# Patient Record
Sex: FEMALE | Race: WHITE | Employment: OTHER | URBAN - METROPOLITAN AREA
[De-identification: names, ages, dates, MRNs, and addresses within clinical notes are randomized per-mention and may not be internally consistent; named-entity substitution may affect disease eponyms.]

---

## 2017-01-11 ENCOUNTER — TELEPHONE (OUTPATIENT)
Dept: GASTROENTEROLOGY | Facility: CLINIC | Age: 62
End: 2017-01-11

## 2017-01-27 ENCOUNTER — TELEPHONE (OUTPATIENT)
Dept: GASTROENTEROLOGY | Facility: CLINIC | Age: 62
End: 2017-01-27

## 2017-01-27 NOTE — TELEPHONE ENCOUNTER
Dr. Tenisha Garrett- please see below communication along with 1/11/17 encounter and advise for prep/sedation.

## 2017-01-27 NOTE — TELEPHONE ENCOUNTER
Dr. Sharmin Sullivan - 6 month colon recall. Last Procedure:  Colon on 8.11.16  Last Diagnosis:  Large polyps X 6 ( 6 month follow up to make sure no residual polyp)  Recalled for (years): 6 months  Sedation used previously:  MAC  Last Prep Used (if known):   Fide Currie

## 2017-01-27 NOTE — TELEPHONE ENCOUNTER
Chart reviewed,   Colonoscopy to follow up on large polyp ascending colon removed piecemeal  - Miralax prep  - MAC sedation

## 2017-02-01 ENCOUNTER — TELEPHONE (OUTPATIENT)
Dept: GASTROENTEROLOGY | Facility: CLINIC | Age: 62
End: 2017-02-01

## 2017-02-01 NOTE — TELEPHONE ENCOUNTER
Scheduled for:  Colonoscopy 86260  Date:  3/21/17  Location:  [facility]  Sedation:  MAC  Time:  1115  Prep:  Miralax/Gatorade, mailed 2/1/17  Meds/Allergies Reconciled?:  Yes  Diagnosis with codes:  History of colon polyps Z86.010  Was patient informed to call i

## 2017-02-01 NOTE — TELEPHONE ENCOUNTER
Pt. States that she was talking to Surgery Scheduler, and the call was dropped. I tried to reach but no answer.

## 2017-02-03 ENCOUNTER — OFFICE VISIT (OUTPATIENT)
Dept: INTERNAL MEDICINE CLINIC | Facility: CLINIC | Age: 62
End: 2017-02-03

## 2017-02-03 ENCOUNTER — LAB ENCOUNTER (OUTPATIENT)
Dept: LAB | Age: 62
End: 2017-02-03
Attending: INTERNAL MEDICINE
Payer: COMMERCIAL

## 2017-02-03 VITALS
WEIGHT: 231.38 LBS | SYSTOLIC BLOOD PRESSURE: 118 MMHG | HEART RATE: 82 BPM | HEIGHT: 63 IN | TEMPERATURE: 98 F | BODY MASS INDEX: 41 KG/M2 | DIASTOLIC BLOOD PRESSURE: 70 MMHG | OXYGEN SATURATION: 99 %

## 2017-02-03 DIAGNOSIS — I71.4 ABDOMINAL AORTIC ANEURYSM (AAA) WITHOUT RUPTURE (HCC): ICD-10-CM

## 2017-02-03 DIAGNOSIS — Z00.00 PHYSICAL EXAM, ANNUAL: Primary | ICD-10-CM

## 2017-02-03 DIAGNOSIS — E78.00 PURE HYPERCHOLESTEROLEMIA: ICD-10-CM

## 2017-02-03 DIAGNOSIS — R35.89 POLYURIA: ICD-10-CM

## 2017-02-03 DIAGNOSIS — Z12.31 VISIT FOR SCREENING MAMMOGRAM: ICD-10-CM

## 2017-02-03 DIAGNOSIS — K57.90 DIVERTICULOSIS OF INTESTINE WITHOUT BLEEDING, UNSPECIFIED INTESTINAL TRACT LOCATION: ICD-10-CM

## 2017-02-03 DIAGNOSIS — R27.0 ATAXIA: ICD-10-CM

## 2017-02-03 DIAGNOSIS — D12.6 ADENOMATOUS POLYP OF COLON, UNSPECIFIED PART OF COLON: ICD-10-CM

## 2017-02-03 DIAGNOSIS — F17.200 SMOKING: ICD-10-CM

## 2017-02-03 DIAGNOSIS — Z00.00 PHYSICAL EXAM, ANNUAL: ICD-10-CM

## 2017-02-03 LAB
ALBUMIN SERPL BCP-MCNC: 4.4 G/DL (ref 3.5–4.8)
ALBUMIN/GLOB SERPL: 1.5 {RATIO} (ref 1–2)
ALP SERPL-CCNC: 63 U/L (ref 32–100)
ALT SERPL-CCNC: 34 U/L (ref 14–54)
ANION GAP SERPL CALC-SCNC: 8 MMOL/L (ref 0–18)
AST SERPL-CCNC: 31 U/L (ref 15–41)
BASOPHILS # BLD: 0.1 K/UL (ref 0–0.2)
BASOPHILS NFR BLD: 1 %
BILIRUB SERPL-MCNC: 0.7 MG/DL (ref 0.3–1.2)
BILIRUB UR QL: NEGATIVE
BUN SERPL-MCNC: 9 MG/DL (ref 8–20)
BUN/CREAT SERPL: 14.3 (ref 10–20)
CALCIUM SERPL-MCNC: 9.5 MG/DL (ref 8.5–10.5)
CHLORIDE SERPL-SCNC: 103 MMOL/L (ref 95–110)
CHOLEST SERPL-MCNC: 202 MG/DL (ref 110–200)
CO2 SERPL-SCNC: 29 MMOL/L (ref 22–32)
COLOR UR: YELLOW
CREAT SERPL-MCNC: 0.63 MG/DL (ref 0.5–1.5)
EOSINOPHIL # BLD: 0.1 K/UL (ref 0–0.7)
EOSINOPHIL NFR BLD: 1 %
ERYTHROCYTE [DISTWIDTH] IN BLOOD BY AUTOMATED COUNT: 13.8 % (ref 11–15)
GLOBULIN PLAS-MCNC: 2.9 G/DL (ref 2.5–3.7)
GLUCOSE SERPL-MCNC: 100 MG/DL (ref 70–99)
GLUCOSE UR-MCNC: NEGATIVE MG/DL
HCT VFR BLD AUTO: 43.5 % (ref 35–48)
HDLC SERPL-MCNC: 81 MG/DL
HGB BLD-MCNC: 14.5 G/DL (ref 12–16)
HGB UR QL STRIP.AUTO: NEGATIVE
KETONES UR-MCNC: NEGATIVE MG/DL
LDLC SERPL CALC-MCNC: 111 MG/DL (ref 0–99)
LEUKOCYTE ESTERASE UR QL STRIP.AUTO: NEGATIVE
LYMPHOCYTES # BLD: 3.3 K/UL (ref 1–4)
LYMPHOCYTES NFR BLD: 36 %
MCH RBC QN AUTO: 31.7 PG (ref 27–32)
MCHC RBC AUTO-ENTMCNC: 33.3 G/DL (ref 32–37)
MCV RBC AUTO: 95.3 FL (ref 80–100)
MONOCYTES # BLD: 0.7 K/UL (ref 0–1)
MONOCYTES NFR BLD: 8 %
NEUTROPHILS # BLD AUTO: 4.9 K/UL (ref 1.8–7.7)
NEUTROPHILS NFR BLD: 53 %
NITRITE UR QL STRIP.AUTO: NEGATIVE
NONHDLC SERPL-MCNC: 121 MG/DL
OSMOLALITY UR CALC.SUM OF ELEC: 289 MOSM/KG (ref 275–295)
PH UR: 5 [PH] (ref 5–8)
PLATELET # BLD AUTO: 211 K/UL (ref 140–400)
PMV BLD AUTO: 9.3 FL (ref 7.4–10.3)
POTASSIUM SERPL-SCNC: 4.2 MMOL/L (ref 3.3–5.1)
PROT SERPL-MCNC: 7.3 G/DL (ref 5.9–8.4)
PROT UR-MCNC: NEGATIVE MG/DL
RBC # BLD AUTO: 4.57 M/UL (ref 3.7–5.4)
RBC #/AREA URNS AUTO: 0 /HPF
SODIUM SERPL-SCNC: 140 MMOL/L (ref 136–144)
SP GR UR STRIP: 1.02 (ref 1–1.03)
TRIGL SERPL-MCNC: 50 MG/DL (ref 1–149)
TSH SERPL-ACNC: 2.19 UIU/ML (ref 0.34–5.6)
UROBILINOGEN UR STRIP-ACNC: <2
VIT B12 SERPL-MCNC: 588 PG/ML (ref 181–914)
VIT C UR-MCNC: 40 MG/DL
WBC # BLD AUTO: 9.2 K/UL (ref 4–11)
WBC #/AREA URNS AUTO: 1 /HPF

## 2017-02-03 PROCEDURE — 82607 VITAMIN B-12: CPT

## 2017-02-03 PROCEDURE — 36415 COLL VENOUS BLD VENIPUNCTURE: CPT

## 2017-02-03 PROCEDURE — 82306 VITAMIN D 25 HYDROXY: CPT

## 2017-02-03 PROCEDURE — 84443 ASSAY THYROID STIM HORMONE: CPT

## 2017-02-03 PROCEDURE — 81001 URINALYSIS AUTO W/SCOPE: CPT

## 2017-02-03 PROCEDURE — 80053 COMPREHEN METABOLIC PANEL: CPT

## 2017-02-03 PROCEDURE — 99396 PREV VISIT EST AGE 40-64: CPT | Performed by: INTERNAL MEDICINE

## 2017-02-03 PROCEDURE — 85025 COMPLETE CBC W/AUTO DIFF WBC: CPT

## 2017-02-03 PROCEDURE — 80061 LIPID PANEL: CPT

## 2017-02-03 NOTE — PROGRESS NOTES
Keerthi Kimball is a 59year old female.     HPI:   Patient presents with:  Physical: colonoscopy lupillo for 3/23, needs referral for mammo pended,  balance has been off.       58 y/o F here for physical exam; had flare of sciatica in Oct 2016; pain respon Negative for chest pain; negative palpitations  Respiratory:  Negative for cough, dyspnea and wheezing  Endocrine:  Negative for abnormal sleep patterns, increased activity, polydipsia and polyphagia  Gastrointestinal:  Negative for abdominal pain, consti abdominal aorta measures 3.2 x 3.1 cm; pt defers repeat testing for now    Smoking  Advised cessation; now 1/2 - 3/4 ppd; check UA    Poor dentition  Now has full dentures on top and bottom with dental implants    Screening mammogram  Mammogram in June 201

## 2017-02-06 LAB — 25(OH)D3 SERPL-MCNC: 24 NG/ML

## 2017-02-21 NOTE — TELEPHONE ENCOUNTER
Scheduled for:  Colonoscopy 18133  Date:    From-3/21/17  To-3/9/17  Location:  [facility]  Sedation:  MAC  Time:   From-1115  To-0800  Prep:  Miralax/Gatorade  Meds/Allergies Reconciled?:  Yes  Diagnosis with codes:  History of colon polyps Z86.010  Was patient

## 2017-03-02 ENCOUNTER — TELEPHONE (OUTPATIENT)
Dept: INTERNAL MEDICINE CLINIC | Facility: CLINIC | Age: 62
End: 2017-03-02

## 2017-03-09 ENCOUNTER — SURGERY (OUTPATIENT)
Age: 62
End: 2017-03-09

## 2017-03-09 ENCOUNTER — HOSPITAL ENCOUNTER (OUTPATIENT)
Facility: HOSPITAL | Age: 62
Setting detail: HOSPITAL OUTPATIENT SURGERY
Discharge: HOME | End: 2017-03-09
Attending: INTERNAL MEDICINE | Admitting: INTERNAL MEDICINE
Payer: COMMERCIAL

## 2017-03-09 ENCOUNTER — ANESTHESIA (OUTPATIENT)
Dept: ENDOSCOPY | Facility: HOSPITAL | Age: 62
End: 2017-03-09
Payer: COMMERCIAL

## 2017-03-09 ENCOUNTER — ANESTHESIA EVENT (OUTPATIENT)
Dept: ENDOSCOPY | Facility: HOSPITAL | Age: 62
End: 2017-03-09
Payer: COMMERCIAL

## 2017-03-09 VITALS
WEIGHT: 225 LBS | BODY MASS INDEX: 39.87 KG/M2 | SYSTOLIC BLOOD PRESSURE: 115 MMHG | HEART RATE: 81 BPM | OXYGEN SATURATION: 100 % | RESPIRATION RATE: 21 BRPM | HEIGHT: 63 IN | DIASTOLIC BLOOD PRESSURE: 83 MMHG

## 2017-03-09 PROCEDURE — 0DBN8ZX EXCISION OF SIGMOID COLON, VIA NATURAL OR ARTIFICIAL OPENING ENDOSCOPIC, DIAGNOSTIC: ICD-10-PCS | Performed by: INTERNAL MEDICINE

## 2017-03-09 PROCEDURE — 45380 COLONOSCOPY AND BIOPSY: CPT | Performed by: INTERNAL MEDICINE

## 2017-03-09 PROCEDURE — 0DBL8ZX EXCISION OF TRANSVERSE COLON, VIA NATURAL OR ARTIFICIAL OPENING ENDOSCOPIC, DIAGNOSTIC: ICD-10-PCS | Performed by: INTERNAL MEDICINE

## 2017-03-09 PROCEDURE — 45385 COLONOSCOPY W/LESION REMOVAL: CPT | Performed by: INTERNAL MEDICINE

## 2017-03-09 RX ORDER — SODIUM CHLORIDE 0.9 % (FLUSH) 0.9 %
10 SYRINGE (ML) INJECTION AS NEEDED
Status: DISCONTINUED | OUTPATIENT
Start: 2017-03-09 | End: 2017-03-09

## 2017-03-09 RX ORDER — NALOXONE HYDROCHLORIDE 0.4 MG/ML
80 INJECTION, SOLUTION INTRAMUSCULAR; INTRAVENOUS; SUBCUTANEOUS AS NEEDED
Status: DISCONTINUED | OUTPATIENT
Start: 2017-03-09 | End: 2017-03-09

## 2017-03-09 RX ORDER — SODIUM CHLORIDE, SODIUM LACTATE, POTASSIUM CHLORIDE, CALCIUM CHLORIDE 600; 310; 30; 20 MG/100ML; MG/100ML; MG/100ML; MG/100ML
INJECTION, SOLUTION INTRAVENOUS CONTINUOUS
Status: DISCONTINUED | OUTPATIENT
Start: 2017-03-09 | End: 2017-03-09

## 2017-03-09 RX ORDER — ONDANSETRON 2 MG/ML
4 INJECTION INTRAMUSCULAR; INTRAVENOUS ONCE AS NEEDED
Status: DISCONTINUED | OUTPATIENT
Start: 2017-03-09 | End: 2017-03-09

## 2017-03-09 RX ORDER — SODIUM CHLORIDE, SODIUM LACTATE, POTASSIUM CHLORIDE, CALCIUM CHLORIDE 600; 310; 30; 20 MG/100ML; MG/100ML; MG/100ML; MG/100ML
INJECTION, SOLUTION INTRAVENOUS CONTINUOUS PRN
Status: DISCONTINUED | OUTPATIENT
Start: 2017-03-09 | End: 2017-03-09 | Stop reason: SURG

## 2017-03-09 RX ADMIN — SODIUM CHLORIDE, SODIUM LACTATE, POTASSIUM CHLORIDE, CALCIUM CHLORIDE: 600; 310; 30; 20 INJECTION, SOLUTION INTRAVENOUS at 08:45:00

## 2017-03-09 RX ADMIN — SODIUM CHLORIDE, SODIUM LACTATE, POTASSIUM CHLORIDE, CALCIUM CHLORIDE: 600; 310; 30; 20 INJECTION, SOLUTION INTRAVENOUS at 08:22:00

## 2017-03-09 NOTE — ANESTHESIA PREPROCEDURE EVALUATION
Anesthesia PreOp Note    HPI:     Gilberto Shepherd is a 59year old female who presents for preoperative consultation requested by: Jones Bosworth, MD    Date of Surgery: 3/9/2017    Procedure(s):  COLONOSCOPY  Indication: Hx of colonic polyps     Pre colon cancer   • Heart Disorder Mother    • Diabetes Mother    • Cancer Brother      primary unknown       Social History   Marital Status: Single  Spouse Name: N/A    Years of Education: N/A  Number of Children: N/A     Occupational History  None on file Anesthesia Plan:   ASA:  2  Plan:   MAC  Post-op Pain Management: IV analgesics and Oral pain medication  Informed Consent Plan and Risks Discussed With:  Patient  Discussed plan with:  Surgeon      I have informed Rohan Watson  of the nature of t

## 2017-03-09 NOTE — H&P
History & Physical Examination    Patient Name: Marko Anderson  MRN: [id number]  [facility]: [id number]  YOB: 1955    Diagnosis: history of colon polyps    Present Illness: see chart      Prescriptions prior to admission:  psyllium (METAMUCIL SM the risks and benefits and alternatives with the patient/family. They understand and agree to proceed with plan of care. [ x ] I have reviewed the History and Physical done within the last 30 days. Any changes noted above. Kia Duran  3/9/2017

## 2017-03-09 NOTE — PROGRESS NOTES
Quick Note:    RN to place on the colon call back for 3 years and mail letter to the pt. Thanks.   ______

## 2017-03-09 NOTE — OPERATIVE REPORT
[facility] HOSP - [facility] Endoscopy Report      Preoperative Diagnosis:  - history of colon polyps    Postoperative Diagnosis:  - colon polyps x 3  - diverticulosis  - internal hemorrhoids    Procedure:    Colonoscopy     Surgeon:  Ernesto Zendejas M.D.

## 2017-03-09 NOTE — ANESTHESIA POSTPROCEDURE EVALUATION
Patient: Elenita Ku    Procedure Summary     Date Anesthesia Start Anesthesia Stop Room / Location    03/09/17 0822  [facility] ENDOSCOPY 05 / [facility] ENDOSCOPY       Procedure Diagnosis Surgeon Responsible Provider    COLONOSCOPY (N/A ) Hx of colonic polyps

## 2017-03-13 ENCOUNTER — TELEPHONE (OUTPATIENT)
Dept: GASTROENTEROLOGY | Facility: CLINIC | Age: 62
End: 2017-03-13

## 2017-03-13 NOTE — TELEPHONE ENCOUNTER
----- Message from Anil Larson MD sent at 3/9/2017  2:04 PM CST -----  RN to place on the colon call back for 3 years and mail letter to the pt. Thanks.

## 2017-03-20 ENCOUNTER — TELEPHONE (OUTPATIENT)
Dept: GASTROENTEROLOGY | Facility: CLINIC | Age: 62
End: 2017-03-20

## 2017-03-20 NOTE — TELEPHONE ENCOUNTER
Pt requesting RN to mail out Arsenio Fitzgerald 63 Pathology Report. For add'l questions pls call. Thank you.

## 2017-04-01 ENCOUNTER — HOSPITAL ENCOUNTER (OUTPATIENT)
Dept: MAMMOGRAPHY | Facility: HOSPITAL | Age: 62
Discharge: HOME | End: 2017-04-01
Attending: INTERNAL MEDICINE
Payer: COMMERCIAL

## 2017-04-01 DIAGNOSIS — Z12.31 VISIT FOR SCREENING MAMMOGRAM: ICD-10-CM

## 2017-04-01 PROCEDURE — 77067 SCR MAMMO BI INCL CAD: CPT

## 2017-05-19 ENCOUNTER — HOSPITAL ENCOUNTER (OUTPATIENT)
Dept: GENERAL RADIOLOGY | Facility: HOSPITAL | Age: 62
Discharge: HOME | End: 2017-05-19
Attending: INTERNAL MEDICINE
Payer: COMMERCIAL

## 2017-05-19 ENCOUNTER — OFFICE VISIT (OUTPATIENT)
Dept: INTERNAL MEDICINE CLINIC | Facility: CLINIC | Age: 62
End: 2017-05-19

## 2017-05-19 VITALS
WEIGHT: 233 LBS | HEART RATE: 48 BPM | DIASTOLIC BLOOD PRESSURE: 78 MMHG | BODY MASS INDEX: 41.29 KG/M2 | SYSTOLIC BLOOD PRESSURE: 110 MMHG | RESPIRATION RATE: 18 BRPM | OXYGEN SATURATION: 97 % | TEMPERATURE: 98 F | HEIGHT: 63 IN

## 2017-05-19 DIAGNOSIS — D12.6 ADENOMATOUS POLYP OF COLON, UNSPECIFIED PART OF COLON: ICD-10-CM

## 2017-05-19 DIAGNOSIS — M47.816 SPONDYLOSIS OF LUMBAR REGION WITHOUT MYELOPATHY OR RADICULOPATHY: ICD-10-CM

## 2017-05-19 DIAGNOSIS — M25.551 RIGHT HIP PAIN: ICD-10-CM

## 2017-05-19 DIAGNOSIS — M25.551 RIGHT HIP PAIN: Primary | ICD-10-CM

## 2017-05-19 DIAGNOSIS — E78.00 PURE HYPERCHOLESTEROLEMIA: ICD-10-CM

## 2017-05-19 DIAGNOSIS — F17.200 SMOKING: ICD-10-CM

## 2017-05-19 PROCEDURE — 72100 X-RAY EXAM L-S SPINE 2/3 VWS: CPT | Performed by: INTERNAL MEDICINE

## 2017-05-19 PROCEDURE — 99212 OFFICE O/P EST SF 10 MIN: CPT | Performed by: INTERNAL MEDICINE

## 2017-05-19 PROCEDURE — 73503 X-RAY EXAM HIP UNI 4/> VIEWS: CPT | Performed by: INTERNAL MEDICINE

## 2017-05-19 PROCEDURE — 99214 OFFICE O/P EST MOD 30 MIN: CPT | Performed by: INTERNAL MEDICINE

## 2017-05-19 NOTE — PROGRESS NOTES
Keerthi Kimball is a 59year old female. HPI:   Patient presents with:  Leg Pain: Pt states right thigh pain/weakness for a few months. Pt started walking on treadmill recently. Pt states pain is worse at night.       58 y/o F with c/o right thigh an mouth daily. Disp:  Rfl:    Multiple Vitamin (ONE-DAILY MULTI VITAMINS) Oral Tab Take 1 tablet by mouth daily.  Disp:  Rfl:        Allergies:    Bactrim [Sulfametho*    Rash              ROS:   Constitutional: no weight loss; no fatigue  Cardiovascular:  Ne cm; pt defers repeat testing for now    Smoking  Advised cessation; now 1/2 - 3/4 ppd; check UA    Poor dentition  Now has full dentures on top and bottom with dental implants    Screening mammogram  Mammogram in April 2017 was normal    Hypercholesterolem

## 2017-10-04 ENCOUNTER — OFFICE VISIT (OUTPATIENT)
Dept: INTERNAL MEDICINE CLINIC | Facility: CLINIC | Age: 62
End: 2017-10-04

## 2017-10-04 VITALS
HEART RATE: 84 BPM | DIASTOLIC BLOOD PRESSURE: 72 MMHG | TEMPERATURE: 98 F | WEIGHT: 242 LBS | BODY MASS INDEX: 41.32 KG/M2 | HEIGHT: 64 IN | SYSTOLIC BLOOD PRESSURE: 142 MMHG

## 2017-10-04 DIAGNOSIS — F17.200 SMOKING: ICD-10-CM

## 2017-10-04 DIAGNOSIS — E78.00 PURE HYPERCHOLESTEROLEMIA: ICD-10-CM

## 2017-10-04 DIAGNOSIS — M16.11 PRIMARY OSTEOARTHRITIS OF RIGHT HIP: Primary | ICD-10-CM

## 2017-10-04 DIAGNOSIS — M47.816 SPONDYLOSIS OF LUMBAR REGION WITHOUT MYELOPATHY OR RADICULOPATHY: ICD-10-CM

## 2017-10-04 DIAGNOSIS — D12.6 ADENOMATOUS POLYP OF COLON, UNSPECIFIED PART OF COLON: ICD-10-CM

## 2017-10-04 DIAGNOSIS — I10 BENIGN HYPERTENSION: ICD-10-CM

## 2017-10-04 PROCEDURE — 90686 IIV4 VACC NO PRSV 0.5 ML IM: CPT | Performed by: INTERNAL MEDICINE

## 2017-10-04 PROCEDURE — 99214 OFFICE O/P EST MOD 30 MIN: CPT | Performed by: INTERNAL MEDICINE

## 2017-10-04 PROCEDURE — 90471 IMMUNIZATION ADMIN: CPT | Performed by: INTERNAL MEDICINE

## 2017-10-04 RX ORDER — MELOXICAM 15 MG/1
15 TABLET ORAL DAILY
Qty: 30 TABLET | Refills: 3 | Status: SHIPPED | OUTPATIENT
Start: 2017-10-04 | End: 2019-01-09

## 2017-10-04 NOTE — PROGRESS NOTES
Shakir Marquez is a 57year old female. HPI:   Patient presents with:  Hip Pain: Pt complains of right hip pain. She knows she needs a hip replacement.  She is hoping for an rx for Meloxicam.      58 y/o F with severe osteoarthritis of right hip her Start date: 6/16/1979  Alcohol use: Yes           1.8 oz/week     Standard drinks or equivalent: 3 per week       Medications (Active prior to today's visit):    Current Outpatient Prescriptions:  Meloxicam 15 MG Oral Tab Take 1 tablet (15 mg total) by enid Toño Costello; EKG today shows NSR without ischemic abnormality     Smoking   Advised cessation; now 1/2 - 3/4 ppd; UA in Feb 2017 neg hematuria; does not wish to take bupropion, or Chantix;      Osteoarthritis of lumbar spine  XR lumbar spine in 2013 shows mode

## 2017-12-20 ENCOUNTER — TELEPHONE (OUTPATIENT)
Dept: INTERNAL MEDICINE CLINIC | Facility: CLINIC | Age: 62
End: 2017-12-20

## 2017-12-20 DIAGNOSIS — D68.9 COAGULOPATHY (HCC): ICD-10-CM

## 2017-12-20 DIAGNOSIS — R53.83 OTHER FATIGUE: ICD-10-CM

## 2017-12-20 DIAGNOSIS — E78.00 HYPERCHOLESTEROLEMIA: Primary | ICD-10-CM

## 2017-12-20 DIAGNOSIS — M16.10 PRIMARY OSTEOARTHRITIS OF HIP, UNSPECIFIED LATERALITY: ICD-10-CM

## 2017-12-20 DIAGNOSIS — R35.89 POLYURIA: ICD-10-CM

## 2017-12-20 DIAGNOSIS — Z01.818 PREOP EXAMINATION: ICD-10-CM

## 2017-12-21 ENCOUNTER — LAB ENCOUNTER (OUTPATIENT)
Dept: LAB | Age: 62
End: 2017-12-21
Attending: INTERNAL MEDICINE
Payer: COMMERCIAL

## 2017-12-21 DIAGNOSIS — R53.83 OTHER FATIGUE: ICD-10-CM

## 2017-12-21 DIAGNOSIS — E78.00 HYPERCHOLESTEROLEMIA: ICD-10-CM

## 2017-12-21 DIAGNOSIS — D68.9 COAGULOPATHY (HCC): ICD-10-CM

## 2017-12-21 DIAGNOSIS — R35.89 POLYURIA: ICD-10-CM

## 2017-12-21 DIAGNOSIS — Z01.818 PREOP EXAMINATION: ICD-10-CM

## 2017-12-21 PROCEDURE — 87086 URINE CULTURE/COLONY COUNT: CPT

## 2017-12-21 PROCEDURE — 84443 ASSAY THYROID STIM HORMONE: CPT

## 2017-12-21 PROCEDURE — 85610 PROTHROMBIN TIME: CPT

## 2017-12-21 PROCEDURE — 85025 COMPLETE CBC W/AUTO DIFF WBC: CPT

## 2017-12-21 PROCEDURE — 80053 COMPREHEN METABOLIC PANEL: CPT

## 2017-12-21 PROCEDURE — 85730 THROMBOPLASTIN TIME PARTIAL: CPT

## 2017-12-21 PROCEDURE — 36415 COLL VENOUS BLD VENIPUNCTURE: CPT

## 2017-12-21 PROCEDURE — 81001 URINALYSIS AUTO W/SCOPE: CPT

## 2017-12-27 ENCOUNTER — OFFICE VISIT (OUTPATIENT)
Dept: INTERNAL MEDICINE CLINIC | Facility: CLINIC | Age: 62
End: 2017-12-27

## 2017-12-27 ENCOUNTER — TELEPHONE (OUTPATIENT)
Dept: INTERNAL MEDICINE CLINIC | Facility: CLINIC | Age: 62
End: 2017-12-27

## 2017-12-27 VITALS
HEIGHT: 64 IN | TEMPERATURE: 97 F | BODY MASS INDEX: 40.74 KG/M2 | HEART RATE: 95 BPM | OXYGEN SATURATION: 98 % | SYSTOLIC BLOOD PRESSURE: 130 MMHG | DIASTOLIC BLOOD PRESSURE: 74 MMHG | WEIGHT: 238.63 LBS

## 2017-12-27 DIAGNOSIS — M16.10 PRIMARY OSTEOARTHRITIS OF HIP, UNSPECIFIED LATERALITY: Primary | ICD-10-CM

## 2017-12-27 DIAGNOSIS — E78.00 PURE HYPERCHOLESTEROLEMIA: ICD-10-CM

## 2017-12-27 DIAGNOSIS — F17.200 SMOKING: ICD-10-CM

## 2017-12-27 DIAGNOSIS — D12.6 ADENOMATOUS POLYP OF COLON, UNSPECIFIED PART OF COLON: ICD-10-CM

## 2017-12-27 PROCEDURE — 99212 OFFICE O/P EST SF 10 MIN: CPT | Performed by: INTERNAL MEDICINE

## 2017-12-27 PROCEDURE — 99214 OFFICE O/P EST MOD 30 MIN: CPT | Performed by: INTERNAL MEDICINE

## 2017-12-27 RX ORDER — IBUPROFEN 200 MG
200 TABLET ORAL EVERY 6 HOURS PRN
COMMUNITY

## 2017-12-27 NOTE — PROGRESS NOTES
Mauro Nugent is a 57year old female. HPI:   Patient presents with:  Surgical/procedure Clearance: Patient scheduled for right hip replacement 1/15/18 with Dr. Tina Rogers at [place]. Had labs done last week.  States she needs an EKG, had one done 10/2017 37.00        Types: Cigarettes     Start date: 6/16/1977  Smokeless tobacco: Never Used                      Alcohol use: Yes           1.8 oz/week     Standard drinks or equivalent: 3 per week       Medications (Active prior to today's visit):    Current thyromegaly  Lymphatics: no lymphadenopathy of neck or groin  Cardiovascular: RRR, S1, S2, no S3 or murmur  Respiratory: lungs without crackles or wheezes  Abdomen: normoactive bowel sounds, soft, non-tender and non-distended  Extremities: no clubbing, cya defers repeat testing for now     Poor dentition  Now has full dentures on top and bottom with dental implants     Screening mammogram  Mammogram in April 2017 was normal     Hypercholesterolemia   in Feb 2017     Carpal tunnel syndrome  Wears bilat

## 2017-12-28 NOTE — TELEPHONE ENCOUNTER
Pt would like to have a copy of the surgical clearance mailed to her. Address verified.     Tasked to getupp

## 2018-01-08 NOTE — TELEPHONE ENCOUNTER
[facility] office - needs clearance, EKG and labs faxed to N:[phone]    T: [phone]  She said she never received it the first time.       Tasked to Nursing

## 2018-07-19 ENCOUNTER — APPOINTMENT (OUTPATIENT)
Dept: ULTRASOUND IMAGING | Facility: HOSPITAL | Age: 63
End: 2018-07-19
Attending: EMERGENCY MEDICINE
Payer: COMMERCIAL

## 2018-07-19 ENCOUNTER — HOSPITAL ENCOUNTER (EMERGENCY)
Facility: HOSPITAL | Age: 63
Discharge: HOME | End: 2018-07-20
Attending: EMERGENCY MEDICINE
Payer: COMMERCIAL

## 2018-07-19 DIAGNOSIS — S86.812A STRAIN OF CALF MUSCLE, LEFT, INITIAL ENCOUNTER: Primary | ICD-10-CM

## 2018-07-19 PROCEDURE — 93971 EXTREMITY STUDY: CPT | Performed by: EMERGENCY MEDICINE

## 2018-07-19 PROCEDURE — 99284 EMERGENCY DEPT VISIT MOD MDM: CPT

## 2018-07-20 VITALS
DIASTOLIC BLOOD PRESSURE: 80 MMHG | HEIGHT: 65 IN | WEIGHT: 235 LBS | BODY MASS INDEX: 39.15 KG/M2 | TEMPERATURE: 98 F | RESPIRATION RATE: 17 BRPM | SYSTOLIC BLOOD PRESSURE: 124 MMHG | OXYGEN SATURATION: 98 % | HEART RATE: 91 BPM

## 2018-07-20 NOTE — ED INITIAL ASSESSMENT (HPI)
Pt c/o cramping/ deep muscle pain to left calf, pt sts that it looks like swollen but she is not sure, pt sts that the reason she come to ER because she wanted to make sure it is not DVT, denies reascent travel, not on blood thinner

## 2018-07-20 NOTE — ED PROVIDER NOTES
Patient Seen in: [facility] AND [facility] Emergency Department    History   Patient presents with:  Deep Vein Thrombosis (cardiovascular)    Stated Complaint: swelling+cramp like pain to left leg    HPI    49-year-old female with past medical history significan to left leg  Other systems are as noted in HPI. Constitutional and vital signs reviewed. All other systems reviewed and negative except as noted above.     Physical Exam   ED Triage Vitals [07/19/18 2312]  BP: 118/75  Pulse: 114  Resp: 18  Temp: 98.1 (entered by Technologist):    Reason For Exam (entered by Technologist):  left leg ayleen horse sunday, still hurts  Other Notes (entered by Technologist): no dvt seen left leg    Additional Information (per Vision Radiologist):      Ultrasound venous lef

## 2019-01-09 ENCOUNTER — OFFICE VISIT (OUTPATIENT)
Dept: INTERNAL MEDICINE CLINIC | Facility: CLINIC | Age: 64
End: 2019-01-09
Payer: MEDICARE

## 2019-01-09 VITALS
TEMPERATURE: 99 F | SYSTOLIC BLOOD PRESSURE: 132 MMHG | BODY MASS INDEX: 40 KG/M2 | HEART RATE: 102 BPM | OXYGEN SATURATION: 96 % | WEIGHT: 240 LBS | DIASTOLIC BLOOD PRESSURE: 78 MMHG

## 2019-01-09 DIAGNOSIS — I71.4 ABDOMINAL AORTIC ANEURYSM (AAA) WITHOUT RUPTURE (HCC): ICD-10-CM

## 2019-01-09 DIAGNOSIS — E55.9 VITAMIN D DEFICIENCY: ICD-10-CM

## 2019-01-09 DIAGNOSIS — R53.83 OTHER FATIGUE: ICD-10-CM

## 2019-01-09 DIAGNOSIS — D12.6 ADENOMATOUS POLYP OF COLON, UNSPECIFIED PART OF COLON: ICD-10-CM

## 2019-01-09 DIAGNOSIS — Z12.31 VISIT FOR SCREENING MAMMOGRAM: ICD-10-CM

## 2019-01-09 DIAGNOSIS — F17.200 SMOKING: ICD-10-CM

## 2019-01-09 DIAGNOSIS — M25.511 ACUTE PAIN OF RIGHT SHOULDER: ICD-10-CM

## 2019-01-09 DIAGNOSIS — I10 BENIGN HYPERTENSION: ICD-10-CM

## 2019-01-09 DIAGNOSIS — E78.00 HYPERCHOLESTEREMIA: ICD-10-CM

## 2019-01-09 DIAGNOSIS — M16.10 PRIMARY OSTEOARTHRITIS OF HIP, UNSPECIFIED LATERALITY: Primary | ICD-10-CM

## 2019-01-09 DIAGNOSIS — K57.90 DIVERTICULOSIS OF INTESTINE WITHOUT BLEEDING, UNSPECIFIED INTESTINAL TRACT LOCATION: ICD-10-CM

## 2019-01-09 PROCEDURE — 99214 OFFICE O/P EST MOD 30 MIN: CPT | Performed by: INTERNAL MEDICINE

## 2019-01-09 PROCEDURE — G0463 HOSPITAL OUTPT CLINIC VISIT: HCPCS | Performed by: INTERNAL MEDICINE

## 2019-01-09 NOTE — PROGRESS NOTES
Antoni Hernandez is a 70year old female. HPI:   Patient presents with:   Follow - Up      69 y/o F with Right hip osteoarthritis here for F/U; s/p right hip THR under care of orthopedics Dr Zoe Vieira on 1/15/18 at [facility]; using cane to ambulate every 6 (six) hours as needed for Pain. Disp:  Rfl:    psyllium (METAMUCIL SMOOTH TEXTURE) 28 % Oral Powd Pack Take 1 packet by mouth daily. Disp:  Rfl:    Multiple Vitamin (ONE-DAILY MULTI VITAMINS) Oral Tab Take 1 tablet by mouth daily.  Disp:  Rfl: ulcerations         ASSESSMENT/PLAN:   Right hip osteoarthritis  s/p right hip THR under care of orthopedics Dr Aida Fatima on 1/15/18 at [facility]; using cane to ambulate;   No longer taking ibuprofen 200 mg po qD     Smoking  Denies dyspnea; advised cess Routine [E]      Meds This Visit:  Requested Prescriptions      No prescriptions requested or ordered in this encounter       Imaging & Referrals:  ARABELLA SCREENING BILAT (CPT=77067)  XR SHOULDER, COMPLETE (MIN 2 VIEWS), RIGHT (CPT=73030)     1/9/2019  [facility]

## 2019-01-17 ENCOUNTER — LAB ENCOUNTER (OUTPATIENT)
Dept: LAB | Age: 64
End: 2019-01-17
Attending: INTERNAL MEDICINE
Payer: MEDICARE

## 2019-01-17 DIAGNOSIS — R53.83 OTHER FATIGUE: ICD-10-CM

## 2019-01-17 DIAGNOSIS — E55.9 VITAMIN D DEFICIENCY: ICD-10-CM

## 2019-01-17 DIAGNOSIS — E78.00 HYPERCHOLESTEREMIA: ICD-10-CM

## 2019-01-17 DIAGNOSIS — I10 BENIGN HYPERTENSION: ICD-10-CM

## 2019-01-17 LAB
ALBUMIN SERPL BCP-MCNC: 4.1 G/DL (ref 3.5–4.8)
ALBUMIN/GLOB SERPL: 1.4 {RATIO} (ref 1–2)
ALP SERPL-CCNC: 64 U/L (ref 32–100)
ALT SERPL-CCNC: 38 U/L (ref 14–54)
ANION GAP SERPL CALC-SCNC: 11 MMOL/L (ref 0–18)
AST SERPL-CCNC: 31 U/L (ref 15–41)
BASOPHILS # BLD: 0.1 K/UL (ref 0–0.2)
BASOPHILS NFR BLD: 1 %
BILIRUB SERPL-MCNC: 0.6 MG/DL (ref 0.3–1.2)
BILIRUB UR QL: NEGATIVE
BUN SERPL-MCNC: 9 MG/DL (ref 8–20)
BUN/CREAT SERPL: 12.7 (ref 10–20)
CALCIUM SERPL-MCNC: 9.4 MG/DL (ref 8.5–10.5)
CHLORIDE SERPL-SCNC: 104 MMOL/L (ref 95–110)
CHOLEST SERPL-MCNC: 202 MG/DL (ref 110–200)
CLARITY UR: CLEAR
CO2 SERPL-SCNC: 24 MMOL/L (ref 22–32)
COLOR UR: YELLOW
CREAT SERPL-MCNC: 0.71 MG/DL (ref 0.5–1.5)
EOSINOPHIL # BLD: 0.2 K/UL (ref 0–0.7)
EOSINOPHIL NFR BLD: 2 %
ERYTHROCYTE [DISTWIDTH] IN BLOOD BY AUTOMATED COUNT: 13.1 % (ref 11–15)
GLOBULIN PLAS-MCNC: 3 G/DL (ref 2.5–3.7)
GLUCOSE SERPL-MCNC: 100 MG/DL (ref 70–99)
GLUCOSE UR-MCNC: NEGATIVE MG/DL
HCT VFR BLD AUTO: 42.3 % (ref 35–48)
HDLC SERPL-MCNC: 71 MG/DL
HGB BLD-MCNC: 14.5 G/DL (ref 12–16)
HGB UR QL STRIP.AUTO: NEGATIVE
KETONES UR-MCNC: NEGATIVE MG/DL
LDLC SERPL CALC-MCNC: 118 MG/DL (ref 0–99)
LEUKOCYTE ESTERASE UR QL STRIP.AUTO: NEGATIVE
LYMPHOCYTES # BLD: 2.5 K/UL (ref 1–4)
LYMPHOCYTES NFR BLD: 29 %
MCH RBC QN AUTO: 32.7 PG (ref 27–32)
MCHC RBC AUTO-ENTMCNC: 34.2 G/DL (ref 32–37)
MCV RBC AUTO: 95.6 FL (ref 80–100)
MONOCYTES # BLD: 0.7 K/UL (ref 0–1)
MONOCYTES NFR BLD: 8 %
NEUTROPHILS # BLD AUTO: 5.2 K/UL (ref 1.8–7.7)
NEUTROPHILS NFR BLD: 60 %
NITRITE UR QL STRIP.AUTO: NEGATIVE
NONHDLC SERPL-MCNC: 131 MG/DL
OSMOLALITY UR CALC.SUM OF ELEC: 287 MOSM/KG (ref 275–295)
PATIENT FASTING: YES
PH UR: 7 [PH] (ref 5–8)
PLATELET # BLD AUTO: 228 K/UL (ref 140–400)
PMV BLD AUTO: 8.7 FL (ref 7.4–10.3)
POTASSIUM SERPL-SCNC: 4.4 MMOL/L (ref 3.3–5.1)
PROT SERPL-MCNC: 7.1 G/DL (ref 5.9–8.4)
PROT UR-MCNC: NEGATIVE MG/DL
RBC # BLD AUTO: 4.43 M/UL (ref 3.7–5.4)
RBC #/AREA URNS AUTO: 0 /HPF
SODIUM SERPL-SCNC: 139 MMOL/L (ref 136–144)
SP GR UR STRIP: 1.01 (ref 1–1.03)
TRIGL SERPL-MCNC: 66 MG/DL (ref 1–149)
TSH SERPL-ACNC: 3.42 UIU/ML (ref 0.45–5.33)
UROBILINOGEN UR STRIP-ACNC: <2
VIT C UR-MCNC: 20 MG/DL
WBC # BLD AUTO: 8.7 K/UL (ref 4–11)
WBC #/AREA URNS AUTO: 1 /HPF

## 2019-01-17 PROCEDURE — 81003 URINALYSIS AUTO W/O SCOPE: CPT

## 2019-01-17 PROCEDURE — 80061 LIPID PANEL: CPT

## 2019-01-17 PROCEDURE — 36415 COLL VENOUS BLD VENIPUNCTURE: CPT

## 2019-01-17 PROCEDURE — 84443 ASSAY THYROID STIM HORMONE: CPT

## 2019-01-17 PROCEDURE — 85025 COMPLETE CBC W/AUTO DIFF WBC: CPT

## 2019-01-17 PROCEDURE — 82306 VITAMIN D 25 HYDROXY: CPT

## 2019-01-17 PROCEDURE — 80053 COMPREHEN METABOLIC PANEL: CPT

## 2019-01-18 ENCOUNTER — TELEPHONE (OUTPATIENT)
Dept: INTERNAL MEDICINE CLINIC | Facility: CLINIC | Age: 64
End: 2019-01-18

## 2019-01-18 LAB — 25(OH)D3 SERPL-MCNC: 25.4 NG/ML (ref 30–100)

## 2019-01-18 NOTE — TELEPHONE ENCOUNTER
Labs 1/18 reviewed; Vitamin D level low at 25 (normal >30); may take Vitamin D 1000 IU po qD (available OTC); all other labs OK; please call pt

## 2019-02-09 ENCOUNTER — HOSPITAL ENCOUNTER (OUTPATIENT)
Dept: MAMMOGRAPHY | Facility: HOSPITAL | Age: 64
Discharge: HOME | End: 2019-02-09
Attending: INTERNAL MEDICINE
Payer: MEDICARE

## 2019-02-09 DIAGNOSIS — Z12.31 VISIT FOR SCREENING MAMMOGRAM: ICD-10-CM

## 2019-02-09 PROCEDURE — 77063 BREAST TOMOSYNTHESIS BI: CPT | Performed by: INTERNAL MEDICINE

## 2019-02-09 PROCEDURE — 77067 SCR MAMMO BI INCL CAD: CPT | Performed by: INTERNAL MEDICINE

## 2019-12-18 ENCOUNTER — OFFICE VISIT (OUTPATIENT)
Dept: INTERNAL MEDICINE CLINIC | Facility: CLINIC | Age: 64
End: 2019-12-18
Payer: MEDICARE

## 2019-12-18 VITALS
BODY MASS INDEX: 39.15 KG/M2 | WEIGHT: 235 LBS | DIASTOLIC BLOOD PRESSURE: 70 MMHG | HEIGHT: 65 IN | SYSTOLIC BLOOD PRESSURE: 132 MMHG | OXYGEN SATURATION: 96 % | HEART RATE: 94 BPM

## 2019-12-18 DIAGNOSIS — E55.9 VITAMIN D DEFICIENCY: ICD-10-CM

## 2019-12-18 DIAGNOSIS — R35.89 POLYURIA: ICD-10-CM

## 2019-12-18 DIAGNOSIS — D12.6 ADENOMATOUS POLYP OF COLON, UNSPECIFIED PART OF COLON: ICD-10-CM

## 2019-12-18 DIAGNOSIS — R53.83 OTHER FATIGUE: ICD-10-CM

## 2019-12-18 DIAGNOSIS — E78.00 HYPERCHOLESTEREMIA: ICD-10-CM

## 2019-12-18 DIAGNOSIS — M16.10 PRIMARY OSTEOARTHRITIS OF HIP, UNSPECIFIED LATERALITY: ICD-10-CM

## 2019-12-18 DIAGNOSIS — Z12.31 VISIT FOR SCREENING MAMMOGRAM: ICD-10-CM

## 2019-12-18 DIAGNOSIS — G56.03 BILATERAL CARPAL TUNNEL SYNDROME: Primary | ICD-10-CM

## 2019-12-18 PROCEDURE — 90662 IIV NO PRSV INCREASED AG IM: CPT | Performed by: INTERNAL MEDICINE

## 2019-12-18 PROCEDURE — G0463 HOSPITAL OUTPT CLINIC VISIT: HCPCS | Performed by: INTERNAL MEDICINE

## 2019-12-18 PROCEDURE — 99214 OFFICE O/P EST MOD 30 MIN: CPT | Performed by: INTERNAL MEDICINE

## 2019-12-18 PROCEDURE — G0009 ADMIN PNEUMOCOCCAL VACCINE: HCPCS | Performed by: INTERNAL MEDICINE

## 2019-12-18 PROCEDURE — G0008 ADMIN INFLUENZA VIRUS VAC: HCPCS | Performed by: INTERNAL MEDICINE

## 2019-12-18 PROCEDURE — 90670 PCV13 VACCINE IM: CPT | Performed by: INTERNAL MEDICINE

## 2019-12-18 NOTE — PROGRESS NOTES
Javier Choudhury is a 75year old female.     HPI:   Patient presents with:  Carpal Tunnel Syndrome      74 y/o F with c/o nocturnal paresthesias since 2013; has been wearing nocturnal wrist splints intermittently since hat time; she presents with c/o wo (VITAMIN D) 1000 units Oral Tab Take 1000 Units daily 1 tablet 0   • ibuprofen 200 MG Oral Tab Take 200 mg by mouth every 6 (six) hours as needed for Pain. • psyllium (METAMUCIL SMOOTH TEXTURE) 28 % Oral Powd Pack Take 1 packet by mouth daily.      • Mu 2017 shows moderate multilevel DJD     Diverticulosis  Seen on colonoscopy in Aug 2016     Colon polyps  S/p colonoscopy in Aug 2016 with six polyps found;  repeat colonoscopy in March 2017 with three polyps removed; next colonoscopy in 3 years (or March 2

## 2020-01-06 ENCOUNTER — TELEPHONE (OUTPATIENT)
Dept: SURGERY | Facility: CLINIC | Age: 65
End: 2020-01-06

## 2020-01-06 NOTE — TELEPHONE ENCOUNTER
LVM for pt to please call the office to reschedule 1/9/20 appointment with Dr Kolby Dykes for bilateral carpal tunnel syndrome till after 1/13/20 EMG completed. Dr Jarrett Roles notified.

## 2020-01-07 ENCOUNTER — TELEPHONE (OUTPATIENT)
Dept: SURGERY | Facility: CLINIC | Age: 65
End: 2020-01-07

## 2020-01-07 NOTE — TELEPHONE ENCOUNTER
See TE encounter on 01/06. Pt now calling to state her hands are in a lot of pain and she is having a lot of difficulty doing every day tasks.   Pt had an appt with  on 01/09, this appt was rescheduled bc pt has EMG scheduled for 01/13 so KIRK w

## 2020-01-08 ENCOUNTER — TELEPHONE (OUTPATIENT)
Dept: INTERNAL MEDICINE CLINIC | Facility: CLINIC | Age: 65
End: 2020-01-08

## 2020-01-08 NOTE — TELEPHONE ENCOUNTER
Please call pt  She was scheduled to see Dr Claudia Victor tomorrow for carpal tunnel  Pt appt was changed to 1/16/20 as she needs EMG first  Pt is not sleeping at night because of pain  Can something be prescribed?   Please call to discuss/advise  Pt uses [facility]

## 2020-01-10 RX ORDER — GABAPENTIN 100 MG/1
CAPSULE ORAL
Qty: 90 CAPSULE | Refills: 1 | Status: SHIPPED | OUTPATIENT
Start: 2020-01-10

## 2020-01-11 NOTE — TELEPHONE ENCOUNTER
Imp- carpal tunnel syndrome; Rec- trial gabapentin 100 mg po TID x3d, then 200 mg po TID thereafter; ERx sent; pt called

## 2020-01-13 ENCOUNTER — MED REC SCAN ONLY (OUTPATIENT)
Dept: NEUROLOGY | Facility: CLINIC | Age: 65
End: 2020-01-13

## 2020-01-13 ENCOUNTER — PROCEDURE VISIT (OUTPATIENT)
Dept: NEUROLOGY | Facility: CLINIC | Age: 65
End: 2020-01-13
Payer: MEDICARE

## 2020-01-13 VITALS — WEIGHT: 235 LBS | BODY MASS INDEX: 40.12 KG/M2 | HEIGHT: 64 IN

## 2020-01-13 DIAGNOSIS — G56.03 BILATERAL CARPAL TUNNEL SYNDROME: Primary | ICD-10-CM

## 2020-01-13 PROCEDURE — 95886 MUSC TEST DONE W/N TEST COMP: CPT | Performed by: PHYSICAL MEDICINE & REHABILITATION

## 2020-01-13 PROCEDURE — 95910 NRV CNDJ TEST 7-8 STUDIES: CPT | Performed by: PHYSICAL MEDICINE & REHABILITATION

## 2020-01-13 NOTE — PROGRESS NOTES
[address]  Electromyography Consultation      History of Present Illness:    Dear Dr. Abilio De Los Santos,  Thank you for the opportunity to see Piper Young for electrodiagnostic consultation today.  As you charles • Heart Disorder Mother    • Diabetes Mother    • Cancer Brother         primary unknown   • Breast Cancer Maternal Aunt    • Ovarian Cancer Neg    • DCIS Neg    • LCIS Neg    • BRCA gene + Neg        CURRENT MEDICATIONS:   Current Outpatient Medications General: No immediate distress   Extremities: peripheral pulses intact, no lower extremity edema bilaterally   Skin: No lesions noted.    Spine: full and painfree lumbar ROM in all directions  Hips: full and painfree ROM   Neuro:   Strength: Upper extremiti R. FLEX CARPI RAD N None None None None N N N N   R. FIRST D INTEROSS N None None None None N N N N   R. ABD POLL BREVIS N None 3+ None None N N N N   L. TRICEPS N None None None None N N N N   L. BICEPS N None None None None N N N N   L.  FLEX CARPI RAD N

## 2020-01-15 ENCOUNTER — TELEPHONE (OUTPATIENT)
Dept: INTERNAL MEDICINE CLINIC | Facility: CLINIC | Age: 65
End: 2020-01-15

## 2020-01-15 NOTE — TELEPHONE ENCOUNTER
Needs something for break thru pain from carpal tunnel treatment   Asks for call back from Dr Maria C Jaime as soon as he comes in    [phone]

## 2020-01-15 NOTE — TELEPHONE ENCOUNTER
Imp- carpal tunnel syndrome; on gabapentin 200 mg po TID; pt seeing Dr Aliza Ambriz tomorrow for consultation;  Rec- increase to gabapentin to 300 mg po TID; pt called

## 2020-01-16 ENCOUNTER — OFFICE VISIT (OUTPATIENT)
Dept: SURGERY | Facility: CLINIC | Age: 65
End: 2020-01-16
Payer: MEDICARE

## 2020-01-16 DIAGNOSIS — G56.03 BILATERAL CARPAL TUNNEL SYNDROME: Primary | ICD-10-CM

## 2020-01-16 PROCEDURE — G0463 HOSPITAL OUTPT CLINIC VISIT: HCPCS | Performed by: PLASTIC SURGERY

## 2020-01-16 PROCEDURE — 99203 OFFICE O/P NEW LOW 30 MIN: CPT | Performed by: PLASTIC SURGERY

## 2020-01-16 RX ORDER — HYDROCODONE BITARTRATE AND ACETAMINOPHEN 7.5; 325 MG/1; MG/1
1 TABLET ORAL
Qty: 10 TABLET | Refills: 0 | Status: SHIPPED | OUTPATIENT
Start: 2020-01-16 | End: 2020-01-23

## 2020-01-16 NOTE — H&P
Monserrat Alonso is a 75year old female that presents with Patient presents with:  Carpal Tunnel Syndrome: bilateral  .    REFERRED BY:  Juan Beth     Pacemaker: No  Latex Allergy: no  Coumadin: No  Plavix: No  Other anticoagulants: No  Cardiac \"sharp. burning\" bilateral hand pain,worst during the night and am.  Rates pain 10/10. Has taken tylenol and ibuprofen, both helpful.     Night symptoms:  Yes , every night    Functional problems: Yes, bilateral hand weakness,drops things,difficulty openin Highest education level: Not on file    Occupational History      Not on file    Social Needs      Financial resource strain: Not on file      Food insecurity:        Worry: Not on file        Inability: Not on file      Transportation needs:        Medi Handed: Not Asked        Right Handed: Not Asked        Currently spends a great deal of time in the sun: Not Asked        Past Sunlamp Treatments for Acne: Not Asked        History of tanning: Not Asked        Hx of Spending [place] of Time in [place]: Not bilateral        SEVERITY INDEX    5 years, Duration, Constant numbness, Absent 2-point discrimination, NR sensory latencies, Denervation potentials, Severe electrodiagnostic changes      DISPOSITION    We had a long discussion.   I explained to the patient before surgery    Because of the severity: Non-recordable motor and sensory latencies and denervation potentials in the APB's, he may not have relief of her symptoms          1/16/2020  Brian Lantigua MD      [facility] Data Reviewed.

## 2020-01-16 NOTE — H&P (VIEW-ONLY)
Andrews Galvez is a 75year old female that presents with Patient presents with:  Carpal Tunnel Syndrome: bilateral  .    REFERRED BY:  Tracey Bagley     Pacemaker: No  Latex Allergy: no  Coumadin: No  Plavix: No  Other anticoagulants: No  Cardiac \"sharp. burning\" bilateral hand pain,worst during the night and am.  Rates pain 10/10. Has taken tylenol and ibuprofen, both helpful.     Night symptoms:  Yes , every night    Functional problems: Yes, bilateral hand weakness,drops things,difficulty openin Highest education level: Not on file    Occupational History      Not on file    Social Needs      Financial resource strain: Not on file      Food insecurity:        Worry: Not on file        Inability: Not on file      Transportation needs:        Medi Handed: Not Asked        Right Handed: Not Asked        Currently spends a great deal of time in the sun: Not Asked        Past Sunlamp Treatments for Acne: Not Asked        History of tanning: Not Asked        Hx of Spending [place] of Time in [place]: Not bilateral        SEVERITY INDEX    5 years, Duration, Constant numbness, Absent 2-point discrimination, NR sensory latencies, Denervation potentials, Severe electrodiagnostic changes      DISPOSITION    We had a long discussion.   I explained to the patient before surgery    Because of the severity: Non-recordable motor and sensory latencies and denervation potentials in the APB's, he may not have relief of her symptoms          1/16/2020  Ernestina Amador MD      [facility] Data Reviewed.

## 2020-01-16 NOTE — PROGRESS NOTES
Pt request for surgery signed by pt and witnessed and signed by RN. Prescription for Norco and narcotic prescription electronically sent to pharmacy per Dr. Willem Funez order and pt instructed to  prescription before surgery.    Pre-Surgical Instru

## 2020-01-17 ENCOUNTER — TELEPHONE (OUTPATIENT)
Dept: SURGERY | Facility: CLINIC | Age: 65
End: 2020-01-17

## 2020-01-17 ENCOUNTER — LAB ENCOUNTER (OUTPATIENT)
Dept: LAB | Age: 65
End: 2020-01-17
Attending: INTERNAL MEDICINE
Payer: MEDICARE

## 2020-01-17 DIAGNOSIS — E55.9 VITAMIN D DEFICIENCY: ICD-10-CM

## 2020-01-17 DIAGNOSIS — E78.00 HYPERCHOLESTEREMIA: ICD-10-CM

## 2020-01-17 DIAGNOSIS — R35.89 POLYURIA: ICD-10-CM

## 2020-01-17 DIAGNOSIS — R53.83 OTHER FATIGUE: ICD-10-CM

## 2020-01-17 LAB
25(OH)D3 SERPL-MCNC: 26.8 NG/ML (ref 30–100)
ALBUMIN SERPL-MCNC: 3.6 G/DL (ref 3.4–5)
ALBUMIN/GLOB SERPL: 0.9 {RATIO} (ref 1–2)
ALP LIVER SERPL-CCNC: 78 U/L (ref 55–142)
ALT SERPL-CCNC: 44 U/L (ref 13–56)
ANION GAP SERPL CALC-SCNC: 6 MMOL/L (ref 0–18)
AST SERPL-CCNC: 36 U/L (ref 15–37)
BASOPHILS # BLD AUTO: 0.04 X10(3) UL (ref 0–0.2)
BASOPHILS NFR BLD AUTO: 0.4 %
BILIRUB SERPL-MCNC: 0.4 MG/DL (ref 0.1–2)
BILIRUB UR QL: NEGATIVE
BUN BLD-MCNC: 10 MG/DL (ref 7–18)
BUN/CREAT SERPL: 16.7 (ref 10–20)
CALCIUM BLD-MCNC: 9.4 MG/DL (ref 8.5–10.1)
CHLORIDE SERPL-SCNC: 103 MMOL/L (ref 98–112)
CHOLEST SMN-MCNC: 144 MG/DL (ref ?–200)
CO2 SERPL-SCNC: 27 MMOL/L (ref 21–32)
COLOR UR: YELLOW
CREAT BLD-MCNC: 0.6 MG/DL (ref 0.55–1.02)
DEPRECATED RDW RBC AUTO: 45.3 FL (ref 35.1–46.3)
EOSINOPHIL # BLD AUTO: 0.17 X10(3) UL (ref 0–0.7)
EOSINOPHIL NFR BLD AUTO: 1.6 %
ERYTHROCYTE [DISTWIDTH] IN BLOOD BY AUTOMATED COUNT: 12.8 % (ref 11–15)
GLOBULIN PLAS-MCNC: 3.9 G/DL (ref 2.8–4.4)
GLUCOSE BLD-MCNC: 105 MG/DL (ref 70–99)
GLUCOSE UR-MCNC: NEGATIVE MG/DL
HCT VFR BLD AUTO: 40.9 % (ref 35–48)
HDLC SERPL-MCNC: 66 MG/DL (ref 40–59)
HGB BLD-MCNC: 13.1 G/DL (ref 12–16)
HGB UR QL STRIP.AUTO: NEGATIVE
IMM GRANULOCYTES # BLD AUTO: 0.04 X10(3) UL (ref 0–1)
IMM GRANULOCYTES NFR BLD: 0.4 %
KETONES UR-MCNC: NEGATIVE MG/DL
LDLC SERPL CALC-MCNC: 63 MG/DL (ref ?–100)
LYMPHOCYTES # BLD AUTO: 2.46 X10(3) UL (ref 1–4)
LYMPHOCYTES NFR BLD AUTO: 23.6 %
M PROTEIN MFR SERPL ELPH: 7.5 G/DL (ref 6.4–8.2)
MCH RBC QN AUTO: 30.7 PG (ref 26–34)
MCHC RBC AUTO-ENTMCNC: 32 G/DL (ref 31–37)
MCV RBC AUTO: 95.8 FL (ref 80–100)
MONOCYTES # BLD AUTO: 0.86 X10(3) UL (ref 0.1–1)
MONOCYTES NFR BLD AUTO: 8.3 %
NEUTROPHILS # BLD AUTO: 6.84 X10 (3) UL (ref 1.5–7.7)
NEUTROPHILS # BLD AUTO: 6.84 X10(3) UL (ref 1.5–7.7)
NEUTROPHILS NFR BLD AUTO: 65.7 %
NITRITE UR QL STRIP.AUTO: NEGATIVE
NONHDLC SERPL-MCNC: 78 MG/DL (ref ?–130)
OSMOLALITY SERPL CALC.SUM OF ELEC: 281 MOSM/KG (ref 275–295)
PATIENT FASTING Y/N/NP: YES
PATIENT FASTING Y/N/NP: YES
PH UR: 6 [PH] (ref 5–8)
PLATELET # BLD AUTO: 259 10(3)UL (ref 150–450)
POTASSIUM SERPL-SCNC: 4.1 MMOL/L (ref 3.5–5.1)
PROT UR-MCNC: NEGATIVE MG/DL
RBC # BLD AUTO: 4.27 X10(6)UL (ref 3.8–5.3)
RBC #/AREA URNS AUTO: 1 /HPF
SODIUM SERPL-SCNC: 136 MMOL/L (ref 136–145)
SP GR UR STRIP: 1.01 (ref 1–1.03)
TRIGL SERPL-MCNC: 74 MG/DL (ref 30–149)
TSI SER-ACNC: 4.27 MIU/ML (ref 0.36–3.74)
UROBILINOGEN UR STRIP-ACNC: <2
VLDLC SERPL CALC-MCNC: 15 MG/DL (ref 0–30)
WBC # BLD AUTO: 10.4 X10(3) UL (ref 4–11)
WBC #/AREA URNS AUTO: 6 /HPF

## 2020-01-17 PROCEDURE — 84443 ASSAY THYROID STIM HORMONE: CPT

## 2020-01-17 PROCEDURE — 82306 VITAMIN D 25 HYDROXY: CPT

## 2020-01-17 PROCEDURE — 80061 LIPID PANEL: CPT

## 2020-01-17 PROCEDURE — 36415 COLL VENOUS BLD VENIPUNCTURE: CPT

## 2020-01-17 PROCEDURE — 81001 URINALYSIS AUTO W/SCOPE: CPT

## 2020-01-17 PROCEDURE — 80053 COMPREHEN METABOLIC PANEL: CPT

## 2020-01-17 PROCEDURE — 85025 COMPLETE CBC W/AUTO DIFF WBC: CPT

## 2020-01-17 NOTE — TELEPHONE ENCOUNTER
Spoke to pt. Pt had various questions about surgery and LECTR procedure. Pt's questions answered appropriately. Pt verbalized understanding.

## 2020-01-18 PROBLEM — G56.03 BILATERAL CARPAL TUNNEL SYNDROME: Status: ACTIVE | Noted: 2020-01-18

## 2020-01-20 ENCOUNTER — TELEPHONE (OUTPATIENT)
Dept: SURGERY | Facility: CLINIC | Age: 65
End: 2020-01-20

## 2020-01-20 DIAGNOSIS — G56.02 CARPAL TUNNEL SYNDROME ON LEFT: Primary | ICD-10-CM

## 2020-01-20 NOTE — TELEPHONE ENCOUNTER
Surgery is scheduled on 1/28/2020. Insurance is [facility] MEDICARE COMPLETE. Thank you!

## 2020-01-23 ENCOUNTER — OFFICE VISIT (OUTPATIENT)
Dept: NEUROLOGY | Facility: CLINIC | Age: 65
End: 2020-01-23
Payer: MEDICARE

## 2020-01-23 VITALS
BODY MASS INDEX: 40.97 KG/M2 | HEART RATE: 90 BPM | HEIGHT: 64 IN | DIASTOLIC BLOOD PRESSURE: 60 MMHG | WEIGHT: 240 LBS | SYSTOLIC BLOOD PRESSURE: 132 MMHG | RESPIRATION RATE: 20 BRPM

## 2020-01-23 DIAGNOSIS — K21.9 GASTROESOPHAGEAL REFLUX DISEASE, ESOPHAGITIS PRESENCE NOT SPECIFIED: ICD-10-CM

## 2020-01-23 DIAGNOSIS — G47.00 INSOMNIA, UNSPECIFIED TYPE: ICD-10-CM

## 2020-01-23 DIAGNOSIS — G56.03 BILATERAL CARPAL TUNNEL SYNDROME: Primary | ICD-10-CM

## 2020-01-23 DIAGNOSIS — F41.9 ANXIETY: ICD-10-CM

## 2020-01-23 PROCEDURE — 99214 OFFICE O/P EST MOD 30 MIN: CPT | Performed by: PHYSICAL MEDICINE & REHABILITATION

## 2020-01-23 RX ORDER — GABAPENTIN 300 MG/1
300 CAPSULE ORAL 3 TIMES DAILY
Qty: 90 CAPSULE | Refills: 0 | Status: SHIPPED | OUTPATIENT
Start: 2020-01-23 | End: 2020-02-20

## 2020-01-23 RX ORDER — ACETAMINOPHEN 500 MG
500 TABLET ORAL EVERY 4 HOURS PRN
COMMUNITY

## 2020-01-23 NOTE — PROGRESS NOTES
[address]  Progress Note    CHIEF COMPLAINT:  Patient presents with:  Hand Pain: Patient presents for bilateral hand pain, referred by Domenica Jane  C/o pain since 12/8/19, is scheduled for surgery 1/28/20 01/2018   • KNEE ARTHROSCOPY      right knee 1970s   • OTHER Left 1998    L rotator cuff repair   • OTHER SURGICAL HISTORY  8/17/14    8 dental implants   • TOTAL HIP REPLACEMENT Right        SOCIAL HISTORY:   Social History    Occupational History      No RASH    REVIEW OF SYSTEMS:   Patient-reported ROS  Constitutional  Sleep Disturbance: admits   Cardiovascular  Chest Pain: denies  Irregular Heartbeat: denies   Gastrointestinal  Bowel Incontinence: denies  Heartburn: denies   Genitourinary  Difficulty Natalya Cordoba difficult to tell me whether it is working or not. In any case she is only on 300 mg 3 times daily. In order for gabapentin to work it needs to be 600 3 times daily.   I gave her a slow taper of increasing gabapentin and she will follow those instructions

## 2020-01-23 NOTE — PATIENT INSTRUCTIONS
[address] [facility]  Dept: [phone]    Taot Paris MD  Physical Medicine    Gabapentin (Neurontin) Starter Scheduls    Please take gabapentin 300 mg tablets three

## 2020-01-28 ENCOUNTER — ANESTHESIA EVENT (OUTPATIENT)
Dept: SURGERY | Facility: HOSPITAL | Age: 65
End: 2020-01-28
Payer: MEDICARE

## 2020-01-28 ENCOUNTER — ANESTHESIA (OUTPATIENT)
Dept: SURGERY | Facility: HOSPITAL | Age: 65
End: 2020-01-28
Payer: MEDICARE

## 2020-01-28 ENCOUNTER — HOSPITAL ENCOUNTER (OUTPATIENT)
Facility: HOSPITAL | Age: 65
Setting detail: HOSPITAL OUTPATIENT SURGERY
Discharge: HOME | End: 2020-01-28
Attending: PLASTIC SURGERY | Admitting: PLASTIC SURGERY
Payer: MEDICARE

## 2020-01-28 ENCOUNTER — HOSPITAL DOCUMENTATION (OUTPATIENT)
Dept: SURGERY | Facility: CLINIC | Age: 65
End: 2020-01-28

## 2020-01-28 VITALS
BODY MASS INDEX: 38.93 KG/M2 | HEART RATE: 90 BPM | WEIGHT: 228 LBS | OXYGEN SATURATION: 94 % | HEIGHT: 64 IN | RESPIRATION RATE: 15 BRPM | TEMPERATURE: 98 F | SYSTOLIC BLOOD PRESSURE: 136 MMHG | DIASTOLIC BLOOD PRESSURE: 64 MMHG

## 2020-01-28 DIAGNOSIS — G56.02 CARPAL TUNNEL SYNDROME ON LEFT: Primary | ICD-10-CM

## 2020-01-28 PROCEDURE — 01N54ZZ RELEASE MEDIAN NERVE, PERCUTANEOUS ENDOSCOPIC APPROACH: ICD-10-PCS | Performed by: PLASTIC SURGERY

## 2020-01-28 PROCEDURE — 29848 WRIST ENDOSCOPY/SURGERY: CPT | Performed by: PLASTIC SURGERY

## 2020-01-28 RX ORDER — DEXAMETHASONE SODIUM PHOSPHATE 4 MG/ML
VIAL (ML) INJECTION AS NEEDED
Status: DISCONTINUED | OUTPATIENT
Start: 2020-01-28 | End: 2020-01-28 | Stop reason: SURG

## 2020-01-28 RX ORDER — HYDROMORPHONE HYDROCHLORIDE 1 MG/ML
0.4 INJECTION, SOLUTION INTRAMUSCULAR; INTRAVENOUS; SUBCUTANEOUS EVERY 5 MIN PRN
Status: DISCONTINUED | OUTPATIENT
Start: 2020-01-28 | End: 2020-01-28

## 2020-01-28 RX ORDER — HYDROMORPHONE HYDROCHLORIDE 1 MG/ML
0.6 INJECTION, SOLUTION INTRAMUSCULAR; INTRAVENOUS; SUBCUTANEOUS EVERY 5 MIN PRN
Status: DISCONTINUED | OUTPATIENT
Start: 2020-01-28 | End: 2020-01-28

## 2020-01-28 RX ORDER — FAMOTIDINE 20 MG/1
20 TABLET ORAL ONCE
Status: COMPLETED | OUTPATIENT
Start: 2020-01-28 | End: 2020-01-28

## 2020-01-28 RX ORDER — SODIUM CHLORIDE, SODIUM LACTATE, POTASSIUM CHLORIDE, CALCIUM CHLORIDE 600; 310; 30; 20 MG/100ML; MG/100ML; MG/100ML; MG/100ML
INJECTION, SOLUTION INTRAVENOUS CONTINUOUS
Status: DISCONTINUED | OUTPATIENT
Start: 2020-01-28 | End: 2020-01-28

## 2020-01-28 RX ORDER — MIDAZOLAM HYDROCHLORIDE 1 MG/ML
INJECTION INTRAMUSCULAR; INTRAVENOUS AS NEEDED
Status: DISCONTINUED | OUTPATIENT
Start: 2020-01-28 | End: 2020-01-28 | Stop reason: SURG

## 2020-01-28 RX ORDER — HYDROCODONE BITARTRATE AND ACETAMINOPHEN 5; 325 MG/1; MG/1
1 TABLET ORAL AS NEEDED
Status: COMPLETED | OUTPATIENT
Start: 2020-01-28 | End: 2020-01-28

## 2020-01-28 RX ORDER — HYDROCODONE BITARTRATE AND ACETAMINOPHEN 5; 325 MG/1; MG/1
2 TABLET ORAL AS NEEDED
Status: COMPLETED | OUTPATIENT
Start: 2020-01-28 | End: 2020-01-28

## 2020-01-28 RX ORDER — HYDROCODONE BITARTRATE AND ACETAMINOPHEN 7.5; 325 MG/1; MG/1
1 TABLET ORAL EVERY 4 HOURS PRN
Status: DISCONTINUED | OUTPATIENT
Start: 2020-01-28 | End: 2020-01-28

## 2020-01-28 RX ORDER — KETOROLAC TROMETHAMINE 30 MG/ML
INJECTION, SOLUTION INTRAMUSCULAR; INTRAVENOUS AS NEEDED
Status: DISCONTINUED | OUTPATIENT
Start: 2020-01-28 | End: 2020-01-28 | Stop reason: SURG

## 2020-01-28 RX ORDER — ONDANSETRON 2 MG/ML
4 INJECTION INTRAMUSCULAR; INTRAVENOUS ONCE AS NEEDED
Status: DISCONTINUED | OUTPATIENT
Start: 2020-01-28 | End: 2020-01-28

## 2020-01-28 RX ORDER — MORPHINE SULFATE 4 MG/ML
4 INJECTION, SOLUTION INTRAMUSCULAR; INTRAVENOUS EVERY 10 MIN PRN
Status: DISCONTINUED | OUTPATIENT
Start: 2020-01-28 | End: 2020-01-28

## 2020-01-28 RX ORDER — NALOXONE HYDROCHLORIDE 0.4 MG/ML
80 INJECTION, SOLUTION INTRAMUSCULAR; INTRAVENOUS; SUBCUTANEOUS AS NEEDED
Status: DISCONTINUED | OUTPATIENT
Start: 2020-01-28 | End: 2020-01-28

## 2020-01-28 RX ORDER — HYDROMORPHONE HYDROCHLORIDE 1 MG/ML
0.2 INJECTION, SOLUTION INTRAMUSCULAR; INTRAVENOUS; SUBCUTANEOUS EVERY 5 MIN PRN
Status: DISCONTINUED | OUTPATIENT
Start: 2020-01-28 | End: 2020-01-28

## 2020-01-28 RX ORDER — PROCHLORPERAZINE EDISYLATE 5 MG/ML
5 INJECTION INTRAMUSCULAR; INTRAVENOUS ONCE AS NEEDED
Status: DISCONTINUED | OUTPATIENT
Start: 2020-01-28 | End: 2020-01-28

## 2020-01-28 RX ORDER — MORPHINE SULFATE 10 MG/ML
6 INJECTION, SOLUTION INTRAMUSCULAR; INTRAVENOUS EVERY 10 MIN PRN
Status: DISCONTINUED | OUTPATIENT
Start: 2020-01-28 | End: 2020-01-28

## 2020-01-28 RX ORDER — ACETAMINOPHEN 500 MG
1000 TABLET ORAL ONCE
Status: COMPLETED | OUTPATIENT
Start: 2020-01-28 | End: 2020-01-28

## 2020-01-28 RX ORDER — ONDANSETRON 2 MG/ML
INJECTION INTRAMUSCULAR; INTRAVENOUS AS NEEDED
Status: DISCONTINUED | OUTPATIENT
Start: 2020-01-28 | End: 2020-01-28 | Stop reason: SURG

## 2020-01-28 RX ORDER — MORPHINE SULFATE 4 MG/ML
2 INJECTION, SOLUTION INTRAMUSCULAR; INTRAVENOUS EVERY 10 MIN PRN
Status: DISCONTINUED | OUTPATIENT
Start: 2020-01-28 | End: 2020-01-28

## 2020-01-28 RX ORDER — METOCLOPRAMIDE 10 MG/1
10 TABLET ORAL ONCE
Status: COMPLETED | OUTPATIENT
Start: 2020-01-28 | End: 2020-01-28

## 2020-01-28 RX ORDER — LIDOCAINE HYDROCHLORIDE 5 MG/ML
INJECTION, SOLUTION INFILTRATION; INTRAVENOUS AS NEEDED
Status: DISCONTINUED | OUTPATIENT
Start: 2020-01-28 | End: 2020-01-28 | Stop reason: SURG

## 2020-01-28 RX ORDER — HALOPERIDOL 5 MG/ML
0.25 INJECTION INTRAMUSCULAR ONCE AS NEEDED
Status: DISCONTINUED | OUTPATIENT
Start: 2020-01-28 | End: 2020-01-28

## 2020-01-28 RX ADMIN — DEXAMETHASONE SODIUM PHOSPHATE 4 MG: 4 MG/ML VIAL (ML) INJECTION at 10:34:00

## 2020-01-28 RX ADMIN — LIDOCAINE HYDROCHLORIDE 50 ML: 5 INJECTION, SOLUTION INFILTRATION; INTRAVENOUS at 09:48:00

## 2020-01-28 RX ADMIN — ONDANSETRON 4 MG: 2 INJECTION INTRAMUSCULAR; INTRAVENOUS at 10:34:00

## 2020-01-28 RX ADMIN — KETOROLAC TROMETHAMINE 30 MG: 30 INJECTION, SOLUTION INTRAMUSCULAR; INTRAVENOUS at 10:34:00

## 2020-01-28 RX ADMIN — MIDAZOLAM HYDROCHLORIDE 2 MG: 1 INJECTION INTRAMUSCULAR; INTRAVENOUS at 09:40:00

## 2020-01-28 NOTE — OPERATIVE REPORT
[facility]    PATIENT'S NAME: Uriah Malone   ATTENDING PHYSICIAN: Angel Councilman, MD   OPERATING PHYSICIAN: Angel Councilman, MD   PATIENT ACCOUNT#:   [id number]    LOCATION:  [address]  MEDICAL RECORD #:   [id number] curved dissector was placed deep to the antebrachial fascia and in continuity with the transverse carpal ligament. We peeled flexor tenosynovium off the transverse carpal ligaments. The slotted cannula and camera were placed.   We had excellent visualizat

## 2020-01-28 NOTE — ANESTHESIA POSTPROCEDURE EVALUATION
Patient: Shakir Marquez    Procedure Summary     Date:  01/28/20 Room / Location:  [facility] OR 01 / [facility] OR    Anesthesia Start:  1439 Anesthesia Stop:  4662    Procedure:  ENDOSCOPIC CARPAL TUNNEL RELEASE (Left ) Diagnosis:  (carpal tunnel syndro

## 2020-01-28 NOTE — BRIEF OP NOTE
Pre-Operative Diagnosis: carpal tunnel syndrome     Post-Operative Diagnosis: * No post-op diagnosis entered *      Procedure Performed:   Procedure(s):  left endoscopic carpal tunnel release    Surgeon(s) and Role:     * Rahel Zavala MD - Delfina

## 2020-01-28 NOTE — INTERVAL H&P NOTE
Pre-op Diagnosis: carpal tunnel syndrome    The above referenced H&P was reviewed by Ashia Mendiola MD on 1/28/2020, the patient was examined and no significant changes have occurred in the patient's condition since the H&P was performed.   I discus

## 2020-01-28 NOTE — ANESTHESIA PROCEDURE NOTES
Airway  Urgency: Elective      General Information and Staff    Patient location during procedure: OR  Anesthesiologist: Rosita Garrido MD  Performed: anesthesiologist     Indications and Patient Condition  Indications for airway management: anesthesia  Se

## 2020-01-28 NOTE — ANESTHESIA PROCEDURE NOTES
Peripheral Block  Date/Time: 1/28/2020 9:48 AM  Performed by: Rosita Garrido MD  Authorized by: Rosita Garrido MD       General Information and Staff    Start Time:  1/28/2020 9:40 AM  End Time:  1/28/2020 9:45 AM  Anesthesiologist:  Rosita Garrido MD  Per

## 2020-01-28 NOTE — ANESTHESIA PREPROCEDURE EVALUATION
Anesthesia PreOp Note    HPI:     Pablo Valerio is a 75year old female who presents for preoperative consultation requested by: Jeffery Wills MD    Date of Surgery: 1/28/2020    Procedure(s):  ENDOSCOPIC CARPAL TUNNEL RELEASE  Indication: c ARTHROSCOPY      right knee 1970s   • OTHER Left 1998    L rotator cuff repair   • OTHER SURGICAL HISTORY  8/17/14    8 dental implants   • TOTAL HIP REPLACEMENT Right        acetaminophen 500 MG Oral Tab, Take 500 mg by mouth every 4 (four) hours as neede insecurity:        Worry: Not on file        Inability: Not on file      Transportation needs:        Medical: Not on file        Non-medical: Not on file    Tobacco Use      Smoking status: Current Every Day Smoker        Packs/day: 0.75        Years: 40. Not Asked        Past Sunlamp Treatments for Acne: Not Asked        History of tanning: Not Asked        Hx of Spending [place] of Time in Sun: Not Asked        Bad sunburns in the past: Not Asked        Tanning Salons in the Past: Not Asked        Hx o attacks,  depression,       GI/Hepatic/Renal    (+) GERD,     Comments: diverticulosis    Endo/Other    Abdominal   (+) obese,      Other findings: Small mouth opening        Anesthesia Plan:   ASA:  3  Plan:   General  Plan Comments: [place] Block plus MAC

## 2020-01-29 ENCOUNTER — OFFICE VISIT (OUTPATIENT)
Dept: SURGERY | Facility: CLINIC | Age: 65
End: 2020-01-29
Payer: MEDICARE

## 2020-01-29 ENCOUNTER — TELEPHONE (OUTPATIENT)
Dept: SURGERY | Facility: CLINIC | Age: 65
End: 2020-01-29

## 2020-01-29 DIAGNOSIS — M62.81 DISTAL MUSCLE WEAKNESS: ICD-10-CM

## 2020-01-29 DIAGNOSIS — M25.642 STIFFNESS OF JOINT, HAND, LEFT: Primary | ICD-10-CM

## 2020-01-29 NOTE — PROGRESS NOTES
Carpal Tunnel Post - Op Note:    Subjective: My left hand does feel better following surgery yesterday.       Objective:  Occupational Therapy completed the following educational areas status post elective carpal tunnel procedure:    1)  Dressing was remove

## 2020-01-29 NOTE — TELEPHONE ENCOUNTER
Left message for PO patient to please call the office with any questions and/or concerns.  Reminded patient of OT appointment today at 1:30pm, OT appointment on 2/11/20 for suture removal, and appointment with OT followed by MD on 2/20/20  Dr. Nba Espitia no

## 2020-02-04 ENCOUNTER — OFFICE VISIT (OUTPATIENT)
Dept: SURGERY | Facility: CLINIC | Age: 65
End: 2020-02-04
Payer: MEDICARE

## 2020-02-04 DIAGNOSIS — M62.81 DISTAL MUSCLE WEAKNESS: ICD-10-CM

## 2020-02-04 DIAGNOSIS — M25.642 STIFFNESS OF JOINT, HAND, LEFT: Primary | ICD-10-CM

## 2020-02-04 PROCEDURE — 97110 THERAPEUTIC EXERCISES: CPT | Performed by: OCCUPATIONAL THERAPIST

## 2020-02-04 PROCEDURE — 97166 OT EVAL MOD COMPLEX 45 MIN: CPT | Performed by: OCCUPATIONAL THERAPIST

## 2020-02-04 NOTE — PROGRESS NOTES
OCCUPATIONAL THERAPY EVALUATION:   Arnoldo Alfonso   [id number]       SUBJECTIVE:    HX of Injury: Severer left wrist and hand pain and numbness. Chief Complaint:   Bilateral hand pain and numbness. .    Precautions: None  Premorbid Functional Status: seen 2 x /week for 3 weeks or a total of 6 visits. Pt. was advised regarding the findings of this evaluation and agrees to the plan of care. Caro Kendall I have reviewed the treatment plan and concur.    Elenita Villareal MD

## 2020-02-11 ENCOUNTER — OFFICE VISIT (OUTPATIENT)
Dept: SURGERY | Facility: CLINIC | Age: 65
End: 2020-02-11
Payer: MEDICARE

## 2020-02-11 DIAGNOSIS — M79.602 PAIN OF LEFT UPPER EXTREMITY: ICD-10-CM

## 2020-02-11 DIAGNOSIS — M25.642 STIFFNESS OF JOINT, HAND, LEFT: Primary | ICD-10-CM

## 2020-02-11 DIAGNOSIS — M62.81 DISTAL MUSCLE WEAKNESS: ICD-10-CM

## 2020-02-11 PROCEDURE — 97110 THERAPEUTIC EXERCISES: CPT | Performed by: OCCUPATIONAL THERAPIST

## 2020-02-11 NOTE — PROGRESS NOTES
Subjective: My left hand is feeling a little bit better. Objective:     Current level of performance:  ADL: Independent  Work: Retired Nurse from [employer].   Leisure: ita    Measurements/Tests:  ROM:         Full left hand composite fist.         Tr

## 2020-02-13 ENCOUNTER — OFFICE VISIT (OUTPATIENT)
Dept: SURGERY | Facility: CLINIC | Age: 65
End: 2020-02-13
Payer: MEDICARE

## 2020-02-13 DIAGNOSIS — M62.81 DISTAL MUSCLE WEAKNESS: ICD-10-CM

## 2020-02-13 DIAGNOSIS — M25.642 STIFFNESS OF JOINT, HAND, LEFT: Primary | ICD-10-CM

## 2020-02-13 PROCEDURE — 97110 THERAPEUTIC EXERCISES: CPT | Performed by: OCCUPATIONAL THERAPIST

## 2020-02-13 PROCEDURE — 99070 SPECIAL SUPPLIES PHYS/QHP: CPT | Performed by: OCCUPATIONAL THERAPIST

## 2020-02-13 PROCEDURE — 97022 WHIRLPOOL THERAPY: CPT | Performed by: OCCUPATIONAL THERAPIST

## 2020-02-13 PROCEDURE — 97035 APP MDLTY 1+ULTRASOUND EA 15: CPT | Performed by: OCCUPATIONAL THERAPIST

## 2020-02-13 NOTE — PROGRESS NOTES
Subjective: The left hand is getting better. Objective:     Current level of performance:  ADL: Independent, however painful. Work: Retired RN  Leisure: Not addressed. Measurements/Tests:  ROM:         Full left hand range of motion.          Treat

## 2020-02-18 ENCOUNTER — OFFICE VISIT (OUTPATIENT)
Dept: SURGERY | Facility: CLINIC | Age: 65
End: 2020-02-18
Payer: MEDICARE

## 2020-02-18 DIAGNOSIS — M25.642 STIFFNESS OF JOINT, HAND, LEFT: Primary | ICD-10-CM

## 2020-02-18 DIAGNOSIS — M62.81 DISTAL MUSCLE WEAKNESS: ICD-10-CM

## 2020-02-18 PROCEDURE — 97022 WHIRLPOOL THERAPY: CPT | Performed by: OCCUPATIONAL THERAPIST

## 2020-02-18 PROCEDURE — 97110 THERAPEUTIC EXERCISES: CPT | Performed by: OCCUPATIONAL THERAPIST

## 2020-02-18 NOTE — PROGRESS NOTES
Subjective: When are my hands going to feels better ?       Objective:     Current level of performance:  ADL: Independent  Work: Retired Nurse  Leisure: Not addressed    Measurements/Tests:  ROM:      NT:            Treatment Provided this day: Fluidothera

## 2020-02-19 ENCOUNTER — TELEPHONE (OUTPATIENT)
Dept: NEUROLOGY | Facility: CLINIC | Age: 65
End: 2020-02-19

## 2020-02-20 ENCOUNTER — OFFICE VISIT (OUTPATIENT)
Dept: SURGERY | Facility: CLINIC | Age: 65
End: 2020-02-20
Payer: MEDICARE

## 2020-02-20 DIAGNOSIS — M62.81 DISTAL MUSCLE WEAKNESS: ICD-10-CM

## 2020-02-20 DIAGNOSIS — M25.642 STIFFNESS OF JOINT, HAND, LEFT: Primary | ICD-10-CM

## 2020-02-20 DIAGNOSIS — G56.03 BILATERAL CARPAL TUNNEL SYNDROME: Primary | ICD-10-CM

## 2020-02-20 PROCEDURE — 99024 POSTOP FOLLOW-UP VISIT: CPT | Performed by: PLASTIC SURGERY

## 2020-02-20 PROCEDURE — G0463 HOSPITAL OUTPT CLINIC VISIT: HCPCS | Performed by: PLASTIC SURGERY

## 2020-02-20 PROCEDURE — 97110 THERAPEUTIC EXERCISES: CPT | Performed by: OCCUPATIONAL THERAPIST

## 2020-02-20 RX ORDER — GABAPENTIN 300 MG/1
CAPSULE ORAL
Qty: 90 CAPSULE | Refills: 0 | Status: SHIPPED | OUTPATIENT
Start: 2020-02-20 | End: 2022-05-06

## 2020-02-20 NOTE — TELEPHONE ENCOUNTER
Medication request: Gabapentin 300 MG oral cap take 1 capsule by mouth three times a day Qty 90     LOV 01/23/2020  NOV none

## 2020-02-20 NOTE — PROGRESS NOTES
Subjective: Am I getting more therapy ? Objective:     Current level of performance:  ADL: Slow, however independent with self care task needs. Work: Retired Nurse  Leisure: Not addressed.     Measurements/Tests:  ROM:  Testing By: kelsey   Strength

## 2020-02-20 NOTE — PROGRESS NOTES
Patient request for surgery signed by patient and witnessed and signed by RN. Has supply of Norco 7.5mg. Pre-Surgical Instruction Handout, Hand Elevation Handout, and Post-Operative Instruction Handout given to and reviewed w/patient.   All questions and

## 2020-02-20 NOTE — TELEPHONE ENCOUNTER
This is not an easy one. First, I need to know if she is taking this medicine and whether she followed through with increasing it to 600mg TID. Then pls tell me what dose she's on. Then we can prescribe her a refill. Then she needs a recheck because she is overdue. Thanks. Ambar Pope

## 2020-02-20 NOTE — PROGRESS NOTES
Surgery 1: Sofy Escobar  - Date: 01/28/20  - Days Since: 23    Patient here for surgical follow-up of left endoscopic carpal tunnel release   States she's doing \"good\"  Denies pain   Complaints of constant numbness and tingling to left hand  Incisional site is

## 2020-02-20 NOTE — H&P
NEEDS R ECTR    1/28/2020 - LECTR GA    1/16/2020 severe bilateral electrodiagnostic changes: NR motor and sensory, denervation potentials      Pacemaker: No  Latex Allergy: no  Coumadin: No  Plavix: No  Other anticoagulants: No  Cardiac stents: No    HA little    Right hand full painless range of motion with excellent wrist hyperextension  Thenar intrinsics intact symmetric  Two-point discrimination greater than 15 thumb, index, middle, radial ring, 7 the rest of the hand  Tinel median wrist positive midd could lead to permanent pain, stiffness, weakness, and loss of function. Even with satisfactory healing, the hand/digit may not regain normal ROM or normal function. PLAN    RECTR    She may not have relief of her symptoms because of the severity. Isa Diaz MD at [facility] Left 1/28/2020    Performed by Ulises Knapp MD at [facility] MAIN OR   • HIP REPLACEMENT SURGERY Right 01/2018   • KNEE ARTHROSCOPY      right knee 1970s   • OTHER Left 1998    L violence:        Fear of current or ex partner: Not on file        Emotionally abused: Not on file        Physically abused: Not on file        Forced sexual activity: Not on file    Other Topics      Concerns:         Service: Not Asked        Blo Inspection - Normal, No abdominal tenderness  NEURO: Memory intact  PSYCH: Oriented to person, place, time, and situation, Appropriate mood and affect        ASSESSMENT/PLAN:   Bilateral carpal tunnel syndrome  (primary encounter diagnosis)          2/20/2

## 2020-02-24 NOTE — TELEPHONE ENCOUNTER
Pt returned call regarding previous encounter.  Patient states she is taking 900mg/day of Gabapentin

## 2020-02-26 NOTE — TELEPHONE ENCOUNTER
Pt returned call was advised according to previous encounter she needed to schedule a follow up appt  Pt declined that she was not told that in LOV.  Pt is requesting a call back

## 2020-02-27 ENCOUNTER — OFFICE VISIT (OUTPATIENT)
Dept: SURGERY | Facility: CLINIC | Age: 65
End: 2020-02-27
Payer: MEDICARE

## 2020-02-27 DIAGNOSIS — M62.81 DISTAL MUSCLE WEAKNESS: ICD-10-CM

## 2020-02-27 DIAGNOSIS — M25.642 STIFFNESS OF JOINT, HAND, LEFT: Primary | ICD-10-CM

## 2020-02-27 PROCEDURE — 97110 THERAPEUTIC EXERCISES: CPT | Performed by: OCCUPATIONAL THERAPIST

## 2020-02-27 PROCEDURE — 97022 WHIRLPOOL THERAPY: CPT | Performed by: OCCUPATIONAL THERAPIST

## 2020-02-27 NOTE — PROGRESS NOTES
Subjective: I am feeling a little stronger.       Objective:     Current level of performance:  ADL: Independent ( slow )  Work: Retired nurse  Leisure: N/A    Measurements/Tests:  ROM:      Full left hand composite fist.            Treatment Provided this

## 2020-03-04 ENCOUNTER — OFFICE VISIT (OUTPATIENT)
Dept: SURGERY | Facility: CLINIC | Age: 65
End: 2020-03-04
Payer: MEDICARE

## 2020-03-04 DIAGNOSIS — M62.81 DISTAL MUSCLE WEAKNESS: ICD-10-CM

## 2020-03-04 DIAGNOSIS — M25.642 STIFFNESS OF JOINT, HAND, LEFT: Primary | ICD-10-CM

## 2020-03-04 PROCEDURE — 97035 APP MDLTY 1+ULTRASOUND EA 15: CPT | Performed by: OCCUPATIONAL THERAPIST

## 2020-03-04 PROCEDURE — 97022 WHIRLPOOL THERAPY: CPT | Performed by: OCCUPATIONAL THERAPIST

## 2020-03-04 NOTE — PROGRESS NOTES
Subjective: I am a little better. Objective:     Current level of performance:  ADL: Independent, but slow. Work: Retired Nurse  Leisure: N/A    Measurements/Tests:  ROM:         Not formally measured this day.          Treatment Provided this day: Fl

## 2020-03-11 ENCOUNTER — APPOINTMENT (OUTPATIENT)
Dept: SURGERY | Facility: CLINIC | Age: 65
End: 2020-03-11
Payer: MEDICARE

## 2020-03-11 DIAGNOSIS — M62.81 DISTAL MUSCLE WEAKNESS: ICD-10-CM

## 2020-03-11 DIAGNOSIS — M25.642 STIFFNESS OF JOINT, HAND, LEFT: Primary | ICD-10-CM

## 2020-03-11 PROCEDURE — 97035 APP MDLTY 1+ULTRASOUND EA 15: CPT | Performed by: OCCUPATIONAL THERAPIST

## 2020-03-11 PROCEDURE — 97022 WHIRLPOOL THERAPY: CPT | Performed by: OCCUPATIONAL THERAPIST

## 2020-03-12 NOTE — PROGRESS NOTES
Subjective:  I need to get my other side done. Objective:     Current level of performance:  ADL: Independent  Work: Retired  Leisure: N/A    Measurements/Tests:  ROM:         Not formally measured this day.          Treatment Provided this day: Fluido

## 2020-03-18 ENCOUNTER — APPOINTMENT (OUTPATIENT)
Dept: SURGERY | Facility: CLINIC | Age: 65
End: 2020-03-18
Payer: MEDICARE

## 2020-03-18 DIAGNOSIS — M25.642 STIFFNESS OF JOINT, HAND, LEFT: Primary | ICD-10-CM

## 2020-03-18 DIAGNOSIS — M62.81 DISTAL MUSCLE WEAKNESS: ICD-10-CM

## 2020-03-18 PROCEDURE — 97022 WHIRLPOOL THERAPY: CPT | Performed by: OCCUPATIONAL THERAPIST

## 2020-03-18 PROCEDURE — 97035 APP MDLTY 1+ULTRASOUND EA 15: CPT | Performed by: OCCUPATIONAL THERAPIST

## 2020-03-19 NOTE — PROGRESS NOTES
Subjective:  My left hand is doing better. Objective:     Current level of performance:  ADL: Independent  Work: Retired nurse  Leisure: N/A    Measurements/Tests:  ROM:         Full left hand range of motion.          Treatment Provided this day: Noni Price

## 2020-04-06 NOTE — TELEPHONE ENCOUNTER
OT called this patient to check on her present status, as well as the progression of her HEP. There has been no returned call to the Clinic as of this time. OT to follow per future physician referrals as applicable. Surya Mendoza.  Kelsie  OTR/AVILA

## 2020-11-09 ENCOUNTER — NURSE ONLY (OUTPATIENT)
Dept: INTERNAL MEDICINE CLINIC | Facility: CLINIC | Age: 65
End: 2020-11-09
Payer: MEDICARE

## 2020-11-09 VITALS — SYSTOLIC BLOOD PRESSURE: 122 MMHG | DIASTOLIC BLOOD PRESSURE: 76 MMHG

## 2020-11-09 PROCEDURE — 90732 PPSV23 VACC 2 YRS+ SUBQ/IM: CPT | Performed by: INTERNAL MEDICINE

## 2020-11-09 PROCEDURE — G0008 ADMIN INFLUENZA VIRUS VAC: HCPCS | Performed by: INTERNAL MEDICINE

## 2020-11-09 PROCEDURE — G0009 ADMIN PNEUMOCOCCAL VACCINE: HCPCS | Performed by: INTERNAL MEDICINE

## 2020-11-09 PROCEDURE — 90662 IIV NO PRSV INCREASED AG IM: CPT | Performed by: INTERNAL MEDICINE

## 2021-03-09 DIAGNOSIS — Z23 NEED FOR VACCINATION: ICD-10-CM

## 2021-03-13 ENCOUNTER — IMMUNIZATION (OUTPATIENT)
Dept: LAB | Facility: HOSPITAL | Age: 66
End: 2021-03-13
Attending: HOSPITALIST
Payer: MEDICARE

## 2021-03-13 DIAGNOSIS — Z23 NEED FOR VACCINATION: Primary | ICD-10-CM

## 2021-03-13 PROCEDURE — 0011A SARSCOV2 VAC 100MCG/0.5ML IM: CPT

## 2021-04-10 ENCOUNTER — IMMUNIZATION (OUTPATIENT)
Dept: LAB | Facility: HOSPITAL | Age: 66
End: 2021-04-10
Attending: EMERGENCY MEDICINE
Payer: MEDICARE

## 2021-04-10 DIAGNOSIS — Z23 NEED FOR VACCINATION: Primary | ICD-10-CM

## 2021-04-10 PROCEDURE — 0012A SARSCOV2 VAC 100MCG/0.5ML IM: CPT

## 2021-06-10 ENCOUNTER — TELEPHONE (OUTPATIENT)
Dept: INTERNAL MEDICINE CLINIC | Facility: CLINIC | Age: 66
End: 2021-06-10

## 2021-12-29 ENCOUNTER — IMMUNIZATION (OUTPATIENT)
Dept: LAB | Facility: HOSPITAL | Age: 66
End: 2021-12-29
Attending: EMERGENCY MEDICINE
Payer: MEDICARE

## 2021-12-29 DIAGNOSIS — Z23 NEED FOR VACCINATION: Primary | ICD-10-CM

## 2021-12-29 PROCEDURE — 0064A SARSCOV2 VAC 50MCG/0.25ML IM: CPT

## 2022-05-06 ENCOUNTER — TELEPHONE (OUTPATIENT)
Dept: INTERNAL MEDICINE CLINIC | Facility: CLINIC | Age: 67
End: 2022-05-06

## 2022-05-06 ENCOUNTER — LAB ENCOUNTER (OUTPATIENT)
Dept: LAB | Age: 67
End: 2022-05-06
Attending: INTERNAL MEDICINE
Payer: MEDICARE

## 2022-05-06 ENCOUNTER — OFFICE VISIT (OUTPATIENT)
Dept: INTERNAL MEDICINE CLINIC | Facility: CLINIC | Age: 67
End: 2022-05-06
Payer: MEDICARE

## 2022-05-06 VITALS
OXYGEN SATURATION: 97 % | HEART RATE: 105 BPM | DIASTOLIC BLOOD PRESSURE: 80 MMHG | WEIGHT: 227 LBS | HEIGHT: 64 IN | SYSTOLIC BLOOD PRESSURE: 172 MMHG | BODY MASS INDEX: 38.76 KG/M2 | TEMPERATURE: 98 F

## 2022-05-06 DIAGNOSIS — Z00.00 ROUTINE HEALTH MAINTENANCE: ICD-10-CM

## 2022-05-06 DIAGNOSIS — E78.5 HYPERLIPIDEMIA, UNSPECIFIED HYPERLIPIDEMIA TYPE: ICD-10-CM

## 2022-05-06 DIAGNOSIS — R03.0 ELEVATED BLOOD PRESSURE READING: ICD-10-CM

## 2022-05-06 DIAGNOSIS — E55.9 VITAMIN D DEFICIENCY: ICD-10-CM

## 2022-05-06 DIAGNOSIS — Z01.00 ROUTINE EYE EXAM: ICD-10-CM

## 2022-05-06 DIAGNOSIS — R03.0 ELEVATED BLOOD PRESSURE READING: Primary | ICD-10-CM

## 2022-05-06 DIAGNOSIS — H43.392 FLOATERS IN VISUAL FIELD, LEFT: ICD-10-CM

## 2022-05-06 DIAGNOSIS — H53.001 LAZY EYE OF RIGHT SIDE: ICD-10-CM

## 2022-05-06 DIAGNOSIS — Z87.891 PERSONAL HISTORY OF TOBACCO USE: ICD-10-CM

## 2022-05-06 LAB
ALBUMIN SERPL-MCNC: 3.9 G/DL (ref 3.4–5)
ALBUMIN/GLOB SERPL: 1 {RATIO} (ref 1–2)
ALP LIVER SERPL-CCNC: 72 U/L
ALT SERPL-CCNC: 42 U/L
ANION GAP SERPL CALC-SCNC: 5 MMOL/L (ref 0–18)
AST SERPL-CCNC: 38 U/L (ref 15–37)
BASOPHILS # BLD AUTO: 0.04 X10(3) UL (ref 0–0.2)
BASOPHILS NFR BLD AUTO: 0.4 %
BILIRUB SERPL-MCNC: 0.5 MG/DL (ref 0.1–2)
BILIRUB UR QL: NEGATIVE
BUN BLD-MCNC: 8 MG/DL (ref 7–18)
BUN/CREAT SERPL: 12.3 (ref 10–20)
CALCIUM BLD-MCNC: 9.4 MG/DL (ref 8.5–10.1)
CHLORIDE SERPL-SCNC: 107 MMOL/L (ref 98–112)
CHOLEST SERPL-MCNC: 202 MG/DL (ref ?–200)
CLARITY UR: CLEAR
CO2 SERPL-SCNC: 29 MMOL/L (ref 21–32)
COLOR UR: YELLOW
CREAT BLD-MCNC: 0.65 MG/DL
DEPRECATED RDW RBC AUTO: 48.6 FL (ref 35.1–46.3)
EOSINOPHIL # BLD AUTO: 0.06 X10(3) UL (ref 0–0.7)
EOSINOPHIL NFR BLD AUTO: 0.7 %
ERYTHROCYTE [DISTWIDTH] IN BLOOD BY AUTOMATED COUNT: 13.2 % (ref 11–15)
FASTING PATIENT LIPID ANSWER: NO
FASTING STATUS PATIENT QL REPORTED: NO
GLOBULIN PLAS-MCNC: 3.8 G/DL (ref 2.8–4.4)
GLUCOSE BLD-MCNC: 97 MG/DL (ref 70–99)
GLUCOSE UR-MCNC: NEGATIVE MG/DL
HCT VFR BLD AUTO: 44.3 %
HDLC SERPL-MCNC: 89 MG/DL (ref 40–59)
HGB BLD-MCNC: 14.3 G/DL
HGB UR QL STRIP.AUTO: NEGATIVE
IMM GRANULOCYTES # BLD AUTO: 0.04 X10(3) UL (ref 0–1)
IMM GRANULOCYTES NFR BLD: 0.4 %
KETONES UR-MCNC: NEGATIVE MG/DL
LDLC SERPL CALC-MCNC: 102 MG/DL (ref ?–100)
LEUKOCYTE ESTERASE UR QL STRIP.AUTO: NEGATIVE
LYMPHOCYTES # BLD AUTO: 2.72 X10(3) UL (ref 1–4)
LYMPHOCYTES NFR BLD AUTO: 30.2 %
MCH RBC QN AUTO: 32.1 PG (ref 26–34)
MCHC RBC AUTO-ENTMCNC: 32.3 G/DL (ref 31–37)
MCV RBC AUTO: 99.3 FL
MONOCYTES # BLD AUTO: 0.64 X10(3) UL (ref 0.1–1)
MONOCYTES NFR BLD AUTO: 7.1 %
NEUTROPHILS # BLD AUTO: 5.5 X10 (3) UL (ref 1.5–7.7)
NEUTROPHILS # BLD AUTO: 5.5 X10(3) UL (ref 1.5–7.7)
NEUTROPHILS NFR BLD AUTO: 61.2 %
NITRITE UR QL STRIP.AUTO: NEGATIVE
NONHDLC SERPL-MCNC: 113 MG/DL (ref ?–130)
OSMOLALITY SERPL CALC.SUM OF ELEC: 290 MOSM/KG (ref 275–295)
PH UR: 6 [PH] (ref 5–8)
PLATELET # BLD AUTO: 236 10(3)UL (ref 150–450)
POTASSIUM SERPL-SCNC: 4 MMOL/L (ref 3.5–5.1)
PROT SERPL-MCNC: 7.7 G/DL (ref 6.4–8.2)
PROT UR-MCNC: NEGATIVE MG/DL
RBC # BLD AUTO: 4.46 X10(6)UL
SODIUM SERPL-SCNC: 141 MMOL/L (ref 136–145)
SP GR UR STRIP: 1.01 (ref 1–1.03)
TRIGL SERPL-MCNC: 60 MG/DL (ref 30–149)
TSI SER-ACNC: 2.35 MIU/ML (ref 0.36–3.74)
UROBILINOGEN UR STRIP-ACNC: <2
VIT C UR-MCNC: NEGATIVE MG/DL
VIT D+METAB SERPL-MCNC: 22.2 NG/ML (ref 30–100)
VLDLC SERPL CALC-MCNC: 10 MG/DL (ref 0–30)
WBC # BLD AUTO: 9 X10(3) UL (ref 4–11)

## 2022-05-06 PROCEDURE — 84443 ASSAY THYROID STIM HORMONE: CPT

## 2022-05-06 PROCEDURE — 80053 COMPREHEN METABOLIC PANEL: CPT

## 2022-05-06 PROCEDURE — 80061 LIPID PANEL: CPT

## 2022-05-06 PROCEDURE — 36415 COLL VENOUS BLD VENIPUNCTURE: CPT

## 2022-05-06 PROCEDURE — 85025 COMPLETE CBC W/AUTO DIFF WBC: CPT

## 2022-05-06 PROCEDURE — 93000 ELECTROCARDIOGRAM COMPLETE: CPT | Performed by: INTERNAL MEDICINE

## 2022-05-06 PROCEDURE — 99214 OFFICE O/P EST MOD 30 MIN: CPT | Performed by: INTERNAL MEDICINE

## 2022-05-06 PROCEDURE — 82306 VITAMIN D 25 HYDROXY: CPT

## 2022-05-06 PROCEDURE — 81003 URINALYSIS AUTO W/O SCOPE: CPT | Performed by: INTERNAL MEDICINE

## 2022-05-09 ENCOUNTER — TELEPHONE (OUTPATIENT)
Dept: INTERNAL MEDICINE CLINIC | Facility: CLINIC | Age: 67
End: 2022-05-09

## 2022-05-09 NOTE — TELEPHONE ENCOUNTER
Please let patient know that I thought her labs came out acceptable from Friday. Her cholesterol was in a fairly satisfactory range    One liver enzyme was trivially elevated. Her vitamin D was slightly low. Please recommend over-the-counter vitamin D supplement 1000 units a day. She should keep her appoint with Dr. Javier Gallaghers May 19 as scheduled to recheck her blood pressure.

## 2022-05-10 ENCOUNTER — TELEPHONE (OUTPATIENT)
Dept: OPHTHALMOLOGY | Facility: CLINIC | Age: 67
End: 2022-05-10

## 2022-05-10 NOTE — TELEPHONE ENCOUNTER
NP.  Patient says she has had a change in floaters in the left eye since last week. She says that now her floater is more prominent in the left eye. She denies any floaters, flashes of light, vision loss or curtain or veil effect OS. She denies surgeries to her eyes. She has not seen any eye doctor in about 10 years. Appointment was booked on 5/17/22 at 1:00 pm with Dr. Ailyn Sheldon. I advised patient that the appointment will be up to 2 hours and that their eyes will be dilated. Patient should bring sunglasses and someone to drive if they think they will be uncomfortable driving. They should bring their current glasses and any eye drops that they take.

## 2022-05-17 ENCOUNTER — OFFICE VISIT (OUTPATIENT)
Dept: OPHTHALMOLOGY | Facility: CLINIC | Age: 67
End: 2022-05-17
Payer: MEDICARE

## 2022-05-17 DIAGNOSIS — H50.05 ALTERNATING ESOTROPIA: ICD-10-CM

## 2022-05-17 DIAGNOSIS — H02.886 MEIBOMIAN GLAND DYSFUNCTION (MGD) OF BOTH EYES: ICD-10-CM

## 2022-05-17 DIAGNOSIS — H02.883 MEIBOMIAN GLAND DYSFUNCTION (MGD) OF BOTH EYES: ICD-10-CM

## 2022-05-17 DIAGNOSIS — H25.13 AGE-RELATED NUCLEAR CATARACT OF BOTH EYES: ICD-10-CM

## 2022-05-17 DIAGNOSIS — H43.393 FLOATER, VITREOUS, BILATERAL: Primary | ICD-10-CM

## 2022-05-17 DIAGNOSIS — H52.31 ANISOMETROPIA: ICD-10-CM

## 2022-05-17 PROBLEM — H43.392 FLOATER, VITREOUS, LEFT: Status: ACTIVE | Noted: 2022-05-17

## 2022-05-17 PROCEDURE — 92015 DETERMINE REFRACTIVE STATE: CPT | Performed by: OPHTHALMOLOGY

## 2022-05-17 PROCEDURE — 92004 COMPRE OPH EXAM NEW PT 1/>: CPT | Performed by: OPHTHALMOLOGY

## 2022-05-17 NOTE — ASSESSMENT & PLAN NOTE
Discussed moderate to advancing cataracts in both eyes that are affecting vision but are not surgical at this time. New glasses today to update as needed.

## 2022-05-19 ENCOUNTER — OFFICE VISIT (OUTPATIENT)
Dept: INTERNAL MEDICINE CLINIC | Facility: CLINIC | Age: 67
End: 2022-05-19
Payer: MEDICARE

## 2022-05-19 VITALS
HEART RATE: 116 BPM | BODY MASS INDEX: 38.76 KG/M2 | WEIGHT: 227 LBS | DIASTOLIC BLOOD PRESSURE: 72 MMHG | TEMPERATURE: 99 F | SYSTOLIC BLOOD PRESSURE: 130 MMHG | HEIGHT: 64 IN | OXYGEN SATURATION: 98 %

## 2022-05-19 DIAGNOSIS — E55.9 VITAMIN D DEFICIENCY: ICD-10-CM

## 2022-05-19 DIAGNOSIS — M16.11 PRIMARY OSTEOARTHRITIS OF RIGHT HIP: ICD-10-CM

## 2022-05-19 DIAGNOSIS — R03.0 ELEVATED BP WITHOUT DIAGNOSIS OF HYPERTENSION: Primary | ICD-10-CM

## 2022-05-19 DIAGNOSIS — G56.02 CARPAL TUNNEL SYNDROME OF LEFT WRIST: ICD-10-CM

## 2022-05-19 DIAGNOSIS — D12.6 ADENOMATOUS POLYP OF COLON, UNSPECIFIED PART OF COLON: ICD-10-CM

## 2022-05-19 PROCEDURE — 99214 OFFICE O/P EST MOD 30 MIN: CPT | Performed by: INTERNAL MEDICINE

## 2022-05-19 RX ORDER — MULTIVIT-MIN/IRON/FOLIC ACID/K 18-600-40
1 CAPSULE ORAL DAILY
COMMUNITY

## 2023-09-18 NOTE — PROGRESS NOTES
Pt presents for flu vaccine and pneumonia vaccine. Spoke to BHARAT COOK for pneumovax today. Name, , medication and order verified. Pt tolerated well.
18-Sep-2023 23:43

## 2024-05-26 ENCOUNTER — HOSPITAL ENCOUNTER (EMERGENCY)
Facility: HOSPITAL | Age: 69
Discharge: HOME | End: 2024-05-26
Attending: STUDENT IN AN ORGANIZED HEALTH CARE EDUCATION/TRAINING PROGRAM

## 2024-05-26 VITALS
WEIGHT: 230 LBS | BODY MASS INDEX: 39 KG/M2 | TEMPERATURE: 98 F | OXYGEN SATURATION: 96 % | RESPIRATION RATE: 20 BRPM | SYSTOLIC BLOOD PRESSURE: 151 MMHG | DIASTOLIC BLOOD PRESSURE: 46 MMHG | HEART RATE: 91 BPM

## 2024-05-26 DIAGNOSIS — T78.40XA ALLERGIC REACTION, INITIAL ENCOUNTER: Primary | ICD-10-CM

## 2024-05-26 LAB
ANION GAP SERPL CALC-SCNC: 7 MMOL/L (ref 0–18)
BASOPHILS # BLD AUTO: 0.02 X10(3) UL (ref 0–0.2)
BASOPHILS NFR BLD AUTO: 0.3 %
BUN BLD-MCNC: 10 MG/DL (ref 9–23)
BUN/CREAT SERPL: 16.9 (ref 10–20)
CALCIUM BLD-MCNC: 9.3 MG/DL (ref 8.7–10.4)
CHLORIDE SERPL-SCNC: 108 MMOL/L (ref 98–112)
CO2 SERPL-SCNC: 25 MMOL/L (ref 21–32)
CREAT BLD-MCNC: 0.59 MG/DL
DEPRECATED RDW RBC AUTO: 47.9 FL (ref 35.1–46.3)
EGFRCR SERPLBLD CKD-EPI 2021: 97 ML/MIN/1.73M2 (ref 60–?)
EOSINOPHIL # BLD AUTO: 0.1 X10(3) UL (ref 0–0.7)
EOSINOPHIL NFR BLD AUTO: 1.3 %
ERYTHROCYTE [DISTWIDTH] IN BLOOD BY AUTOMATED COUNT: 13.4 % (ref 11–15)
GLUCOSE BLD-MCNC: 115 MG/DL (ref 70–99)
HCT VFR BLD AUTO: 45.3 %
HGB BLD-MCNC: 15.1 G/DL
IMM GRANULOCYTES # BLD AUTO: 0.04 X10(3) UL (ref 0–1)
IMM GRANULOCYTES NFR BLD: 0.5 %
LYMPHOCYTES # BLD AUTO: 1.83 X10(3) UL (ref 1–4)
LYMPHOCYTES NFR BLD AUTO: 23.1 %
MCH RBC QN AUTO: 32.3 PG (ref 26–34)
MCHC RBC AUTO-ENTMCNC: 33.3 G/DL (ref 31–37)
MCV RBC AUTO: 96.8 FL
MONOCYTES # BLD AUTO: 0.52 X10(3) UL (ref 0.1–1)
MONOCYTES NFR BLD AUTO: 6.6 %
NEUTROPHILS # BLD AUTO: 5.41 X10 (3) UL (ref 1.5–7.7)
NEUTROPHILS # BLD AUTO: 5.41 X10(3) UL (ref 1.5–7.7)
NEUTROPHILS NFR BLD AUTO: 68.2 %
OSMOLALITY SERPL CALC.SUM OF ELEC: 290 MOSM/KG (ref 275–295)
PLATELET # BLD AUTO: 233 10(3)UL (ref 150–450)
POTASSIUM SERPL-SCNC: 3.9 MMOL/L (ref 3.5–5.1)
RBC # BLD AUTO: 4.68 X10(6)UL
SODIUM SERPL-SCNC: 140 MMOL/L (ref 136–145)
WBC # BLD AUTO: 7.9 X10(3) UL (ref 4–11)

## 2024-05-26 PROCEDURE — 96375 TX/PRO/DX INJ NEW DRUG ADDON: CPT

## 2024-05-26 PROCEDURE — 96374 THER/PROPH/DIAG INJ IV PUSH: CPT

## 2024-05-26 PROCEDURE — S0028 INJECTION, FAMOTIDINE, 20 MG: HCPCS | Performed by: STUDENT IN AN ORGANIZED HEALTH CARE EDUCATION/TRAINING PROGRAM

## 2024-05-26 PROCEDURE — 80048 BASIC METABOLIC PNL TOTAL CA: CPT | Performed by: STUDENT IN AN ORGANIZED HEALTH CARE EDUCATION/TRAINING PROGRAM

## 2024-05-26 PROCEDURE — 96361 HYDRATE IV INFUSION ADD-ON: CPT

## 2024-05-26 PROCEDURE — 99284 EMERGENCY DEPT VISIT MOD MDM: CPT

## 2024-05-26 PROCEDURE — 85025 COMPLETE CBC W/AUTO DIFF WBC: CPT | Performed by: STUDENT IN AN ORGANIZED HEALTH CARE EDUCATION/TRAINING PROGRAM

## 2024-05-26 RX ORDER — METHYLPREDNISOLONE SODIUM SUCCINATE 125 MG/2ML
125 INJECTION, POWDER, LYOPHILIZED, FOR SOLUTION INTRAMUSCULAR; INTRAVENOUS ONCE
Status: COMPLETED | OUTPATIENT
Start: 2024-05-26 | End: 2024-05-26

## 2024-05-26 RX ORDER — PREDNISONE 20 MG/1
40 TABLET ORAL DAILY
Qty: 10 TABLET | Refills: 0 | Status: SHIPPED | OUTPATIENT
Start: 2024-05-26 | End: 2024-05-31

## 2024-05-26 RX ORDER — FAMOTIDINE 10 MG/ML
20 INJECTION, SOLUTION INTRAVENOUS ONCE
Status: COMPLETED | OUTPATIENT
Start: 2024-05-26 | End: 2024-05-26

## 2024-05-26 RX ORDER — DIPHENHYDRAMINE HYDROCHLORIDE 50 MG/ML
25 INJECTION INTRAMUSCULAR; INTRAVENOUS ONCE
Status: COMPLETED | OUTPATIENT
Start: 2024-05-26 | End: 2024-05-26

## 2024-05-26 RX ORDER — EPINEPHRINE 0.3 MG/.3ML
0.3 INJECTION SUBCUTANEOUS
Qty: 1 EACH | Refills: 0 | Status: SHIPPED | OUTPATIENT
Start: 2024-05-26 | End: 2024-06-25

## 2024-05-26 NOTE — DISCHARGE INSTRUCTIONS
Call 911  Call 911 right away if any of these occur:   Trouble breathing or swallowing, wheezing  Cool, moist, pale skin  Shortness of breath  Hoarse voice or trouble speaking  Confusion   Very drowsy or trouble awakening  Fainting or loss of consciousness  Rapid heart rate  Feeling of dizziness or weakness or a sudden drop in blood pressure  Feeling of doom  Feeling lightheaded  Severe nausea or vomiting, or diarrhea  Seizure  Swelling in the face, eyelids, lips, mouth, throat, or tongue  Drooling  When to seek medical advice  Call your healthcare provider or get medical care right away if any of these occur:   Spreading areas of itching, redness, or swelling  Nausea or stomach cramps or abdominal pain  Symptoms that continue, get worse, or happen more than once  Spreading areas of redness, swelling, or itching  Signs of infection at the affected site:  Spreading redness  Increased pain or swelling  Fluid or colored drainage from the site  Fever of 100.4°F (38°C) or higher, or as directed by your healthcare provider

## 2024-05-26 NOTE — ED PROVIDER NOTES
Patient Seen in: [facility] [place] Emergency Department      History     Chief Complaint   Patient presents with    Allergic Rxn Allergies     Stated Complaint: allergic reaction    Subjective:   HPI    68-year-old female with history of anxiety depression, OA, presenting for signs symptoms concerning for an allergic reaction.  Onset of symptoms about 6 PM yesterday.  She returned from a family party and developed diffuse itching and rash.  Associate with some waxing and waning swelling of both lips.  Upper lip swelling is resolved but lower lip swelling still persists although improved.  No throat closing sensation difficulty breathing or swallowing or changes in voice.  She denies any new exposures to detergents dyes soaps animals or medications.    Objective:   Past Medical History:    AAA (abdominal aortic aneurysm) (HCC)    CT Feb 2013; 2.5 cm AAA    Adrenal adenoma    CT Feb 2013 with 11 mm left adrenal adenoma    Age-related nuclear cataract of both eyes    Alternating esotropia    Anisometropia    Ankle fracture, right    Anxiety state    Back problem    Carpal tunnel syndrome on left    Cataract    Depression    Diverticulitis    Diverticulosis of large intestine    Macular pucker    right eye    Meibomian gland dysfunction (MGD) of both eyes    Osteoarthritis    Osteoarthritis of lumbar spine    PONV (postoperative nausea and vomiting)    Poor dentition    Rotator cuff tear, left    s/p repair ; Ortho Dr Velasquez    Smoking    Visual impairment              Past Surgical History:   Procedure Laterality Date    Arthroscopy of joint unlisted      Colonoscopy  8/11/16    Dr. Duran    Colonoscopy N/A 3/9/2017    Procedure: COLONOSCOPY;  Surgeon: Neptali Duran MD;  Location: [facility] ENDOSCOPY    Colonoscopy      Hip replacement surgery Right 01/2018    Knee arthroscopy      right knee 1970s    Other Left 1998    L rotator cuff repair    Other surgical history  8/17/14    8 dental implants    Total hip  replacement Right     Wrist arthroscop,release xvers lig Left 01/28/2020    Left endoscopic carpal tunnel release                Social History     Socioeconomic History    Marital status: Single   Tobacco Use    Smoking status: Every Day     Current packs/day: 0.75     Average packs/day: 0.8 packs/day for 46.9 years (35.2 ttl pk-yrs)     Types: Cigarettes     Start date: 6/16/1977    Smokeless tobacco: Never   Vaping Use    Vaping status: Never Used   Substance and Sexual Activity    Alcohol use: Yes     Alcohol/week: 3.0 standard drinks of alcohol     Types: 3 Standard drinks or equivalent per week     Comment: Occasionally    Drug use: No    Sexual activity: Not Currently     Partners: Female   Other Topics Concern    Caffeine Concern Yes     Comment: Coffee: 3 cups daily    Exercise No   Social History Narrative    The patient does not use an assistive device..      The patient does live in a home with stairs.              Review of Systems    Positive for stated complaint: allergic reaction  Other systems are as noted in HPI.  Constitutional and vital signs reviewed.      All other systems reviewed and negative except as noted above.    Physical Exam     ED Triage Vitals [05/26/24 0606]   /84   Pulse 111   Resp 20   Temp 98 °F (36.7 °C)   Temp src Temporal   SpO2 96 %   O2 Device None (Room air)       Current Vitals:   Vital Signs  BP: 151/46  Pulse: 91  Resp: 20  Temp: 98 °F (36.7 °C)  Temp src: Temporal  MAP (mmHg): 76    Oxygen Therapy  SpO2: 96 %  O2 Device: None (Room air)            Physical Exam    Constitutional: awake, alert, no sig distress  HENT: mmm, no lesions,  Neck: normal range of motion, no tenderness, supple.  Eyes: PERRL, EOMI, conjunctiva normal, no discharge. Sclera anicteric.  Cardiovascular: rr no murmur  Respiratory: Normal breath sounds, no respiratory distress, no wheezing, no chest tenderness.  GI: Bowel sounds normal, Soft, no tenderness, no masses, no pulsatile masses.  : No  CVA tenderness.  Skin: Warm, dry, no erythema, no rash.  Musculoskeletal: Intact distal pulses, no edema, no tenderness, no cyanosis, no clubbing. Good range of motion in all major joints. No tenderness to palpation or major deformities noted. Back- No tenderness.  Neurologic: Alert & oriented x 3, normal motor function, normal sensory function, no focal deficits noted.  Psych: Calm, cooperative, nl affect        ED Course     Labs Reviewed   BASIC METABOLIC PANEL (8) - Abnormal; Notable for the following components:       Result Value    Glucose 115 (*)     All other components within normal limits   CBC W/ DIFFERENTIAL - Abnormal; Notable for the following components:    RDW-SD 47.9 (*)     All other components within normal limits   CBC WITH DIFFERENTIAL WITH PLATELET    Narrative:     The following orders were created for panel order CBC With Differential With Platelet.  Procedure                               Abnormality         Status                     ---------                               -----------         ------                     CBC W/ DIFFERENTIAL[[id number]]          Abnormal            Final result                 Please view results for these tests on the individual orders.                      MDM      68M history as above presenting for evaluation of rash, lip swelling.  Arrival vitals are stable reassuring she mildly tachycardic.  She is in no respiratory distress, has very mild angioedema involving lower lip.  No tongue swelling no swelling of the posterior pharynx.  Will give steroids Benadryl Pepcid and reassess patient    Patient with improvement of her symptoms after steroids Benadryl and Pepcid.  Comfortable discharge home.  Return precautions and follow-up instructions were discussed with patient who voiced understanding and agreement the plan.  All questions were answered to patient satisfaction.                               Medical Decision Making      Disposition and Plan      Clinical Impression:  1. Allergic reaction, initial encounter         Disposition:  Discharge  5/26/2024  7:49 am    Follow-up:  Isael Boogie MD  [address] [facility] [id number] [phone]    Follow up in 2 day(s)      [facility] Emergency Department  [address]  [place] [address]  [phone]  Follow up  As needed, If symptoms worsen    We recommend that you schedule follow up care with a primary care provider within the next three months to obtain basic health screening including reassessment of your blood pressure.      Medications Prescribed:  Discharge Medication List as of 5/26/2024  7:53 AM        START taking these medications    Details   predniSONE 20 MG Oral Tab Take 2 tablets (40 mg total) by mouth daily for 5 days., Normal, Disp-10 tablet, R-0      EPINEPHrine 0.3 MG/0.3ML Injection Solution Auto-injector Inject 0.3 mL (1 each total) into the muscle daily as needed., Normal, Disp-1 each, R-0

## 2024-05-26 NOTE — ED INITIAL ASSESSMENT (HPI)
X1 day generalized intermittent itching and erythema, mild lip swelling occurring overnight.  No respiratory distress, speaking full clear sentences, afebrile    Denies any new soaps, creams, foods, medications.  Went to a graduation party yesterday

## 2024-05-28 ENCOUNTER — OFFICE VISIT (OUTPATIENT)
Dept: INTERNAL MEDICINE CLINIC | Facility: CLINIC | Age: 69
End: 2024-05-28

## 2024-05-28 VITALS
DIASTOLIC BLOOD PRESSURE: 80 MMHG | TEMPERATURE: 98 F | BODY MASS INDEX: 38.58 KG/M2 | SYSTOLIC BLOOD PRESSURE: 124 MMHG | HEIGHT: 64 IN | OXYGEN SATURATION: 96 % | WEIGHT: 226 LBS | HEART RATE: 116 BPM

## 2024-05-28 DIAGNOSIS — L50.9 URTICARIA: Primary | ICD-10-CM

## 2024-05-28 PROCEDURE — 99214 OFFICE O/P EST MOD 30 MIN: CPT | Performed by: INTERNAL MEDICINE

## 2024-05-28 NOTE — PROGRESS NOTES
Asia Jefferson is a 68 year old female.  Chief Complaint   Patient presents with    ER F/U     Allergic Reaction      HPI:     ER follow up from 5/26/24.    She was at a graduation party at her niece's house on 5/25 -- ate some taco chips, carrot sticks.  As she was in the car driving home at ~6:30pm, she stopped to fill her car w/gas. Got back in the car -- developed itching in neck, then upper back and arms, then legs and abdomen.  She got home -- no obvious rash.  The itching continued.  She ate some yogurt and drank a beer.  She then went to sleep.  Awoke at 2 am with red blotches on her arms; kept scratching her arms.  Tried using cold compresses.  Awoke an hour later with hives on her arms.  Her lips then started to swell; no throat closing.  Felt pressure in her ears.    She waited another hour.  Upper lip swelling was better; lower lip still swollen.    She called a friend at 5 am who brought her to ER.     Thought to have an allergic reaction though no known etiology.  Vitals were stable.  CBC, CMP normal.   Received IV benadryl, solumedrol, pepcid  Sent home with prednisone 40 mg/day    Pt does not take prescription meds.  Takes a MVI and vitamin D but they are not new.        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      Current Outpatient Medications   Medication Sig Dispense Refill    predniSONE 20 MG Oral Tab Take 2 tablets (40 mg total) by mouth daily for 5 days. 10 tablet 0    EPINEPHrine 0.3 MG/0.3ML Injection Solution Auto-injector Inject 0.3 mL (1 each total) into the muscle daily as needed. 1 each 0    Vitamin D, Cholecalciferol, 25 MCG (1000 UT) Oral Tab Take 1 tablet by mouth daily.      psyllium (METAMUCIL SMOOTH TEXTURE) 28 % Oral Powd Pack Take 1 packet by mouth daily.      Multiple Vitamin (ONE-DAILY MULTI VITAMINS) Oral Tab Take 1 tablet by mouth daily.        Past Medical History:    AAA (abdominal aortic aneurysm) (HCC)    CT Feb 2013; 2.5 cm AAA    Adrenal adenoma    CT Feb 2013 with 11 mm left  adrenal adenoma    Age-related nuclear cataract of both eyes    Alternating esotropia    Anisometropia    Ankle fracture, right    Anxiety state    Back problem    Carpal tunnel syndrome on left    Cataract    Depression    Diverticulitis    Diverticulosis of large intestine    Macular pucker    right eye    Meibomian gland dysfunction (MGD) of both eyes    Osteoarthritis    Osteoarthritis of lumbar spine    PONV (postoperative nausea and vomiting)    Poor dentition    Rotator cuff tear, left    s/p repair ; Ortho Dr Velasquez    Smoking    Visual impairment      Social History:  Social History     Socioeconomic History    Marital status: Single   Tobacco Use    Smoking status: Every Day     Current packs/day: 0.75     Average packs/day: 0.8 packs/day for 46.9 years (35.2 ttl pk-yrs)     Types: Cigarettes     Start date: 6/16/1977    Smokeless tobacco: Never   Vaping Use    Vaping status: Never Used   Substance and Sexual Activity    Alcohol use: Yes     Alcohol/week: 3.0 standard drinks of alcohol     Types: 3 Standard drinks or equivalent per week     Comment: Occasionally    Drug use: No    Sexual activity: Not Currently     Partners: Female   Other Topics Concern    Caffeine Concern Yes     Comment: Coffee: 3 cups daily    Exercise No   Social History Narrative    The patient does not use an assistive device..      The patient does live in a home with stairs.        REVIEW OF SYSTEMS:   GENERAL HEALTH: feels well otherwise  RESPIRATORY: no SOB  CARDIOVASCULAR: no chest pain/pressure  GI: no nausea, vomiting, diarrhea    Wt Readings from Last 5 Encounters:   05/28/24 226 lb (102.5 kg)   05/26/24 230 lb (104.3 kg)   05/19/22 227 lb (103 kg)   05/06/22 227 lb (103 kg)   01/28/20 228 lb (103.4 kg)     Body mass index is 38.79 kg/m².      EXAM:   /80   Pulse 116   Temp 98.2 °F (36.8 °C) (Oral)   Ht 5' 4\" (1.626 m)   Wt 226 lb (102.5 kg)   SpO2 96%   BMI 38.79 kg/m²   GENERAL: well developed, well  nourished, in no apparent distress  HEENT: normal oropharynx  NECK: supple, no adenopathy, no bruits or stridor  LUNGS: clear to auscultation  CARDIO: RRR, normal S1S2, without murmur or gallop  GI: soft, NT, ND, NABS  EXTREMITIES: no cyanosis, clubbing or edema  SKIN: linear horizonal petechiae on abdomen.  +blotchy petchiae on bilateral forearms    ASSESSMENT AND PLAN:     Urticaria, angiodema  -unclear etiology; was not taking any prescription meds; only MVI and vitamin D  -idiopathic urticaria?  -CBC normal; no eosinophilia  -sx improved with solumedrol, benedryl  Still taking pepcid, prednisone  Has an EpiPen from the ED  -itching recurred yesterday along with urticaria on arms  -rec that she see allergist Dr. Tracy        The patient indicates understanding of these issues and agrees to the plan.    Lori Ahumada MD, 05/28/24, 3:39 PM

## 2024-08-22 ENCOUNTER — OFFICE VISIT (OUTPATIENT)
Dept: ALLERGY | Facility: CLINIC | Age: 69
End: 2024-08-22

## 2024-08-22 ENCOUNTER — NURSE ONLY (OUTPATIENT)
Dept: ALLERGY | Facility: CLINIC | Age: 69
End: 2024-08-22

## 2024-08-22 VITALS — OXYGEN SATURATION: 95 % | HEART RATE: 80 BPM | DIASTOLIC BLOOD PRESSURE: 64 MMHG | SYSTOLIC BLOOD PRESSURE: 100 MMHG

## 2024-08-22 DIAGNOSIS — J30.2 SEASONAL AND PERENNIAL ALLERGIC RHINOCONJUNCTIVITIS: Primary | ICD-10-CM

## 2024-08-22 DIAGNOSIS — H10.10 SEASONAL AND PERENNIAL ALLERGIC RHINOCONJUNCTIVITIS: Primary | ICD-10-CM

## 2024-08-22 DIAGNOSIS — T78.3XXA ANGIOEDEMA, INITIAL ENCOUNTER: ICD-10-CM

## 2024-08-22 DIAGNOSIS — T78.40XA ALLERGIC REACTION, INITIAL ENCOUNTER: ICD-10-CM

## 2024-08-22 DIAGNOSIS — R22.0 LIP SWELLING: ICD-10-CM

## 2024-08-22 DIAGNOSIS — L50.8 ACUTE URTICARIA: Primary | ICD-10-CM

## 2024-08-22 DIAGNOSIS — J30.89 SEASONAL AND PERENNIAL ALLERGIC RHINOCONJUNCTIVITIS: Primary | ICD-10-CM

## 2024-08-22 PROCEDURE — 99204 OFFICE O/P NEW MOD 45 MIN: CPT | Performed by: ALLERGY & IMMUNOLOGY

## 2024-08-22 PROCEDURE — 95004 PERQ TESTS W/ALRGNC XTRCS: CPT | Performed by: ALLERGY & IMMUNOLOGY

## 2024-08-22 NOTE — PROGRESS NOTES
Asia Jefferson is a 69 year old female.    HPI:     Chief Complaint   Patient presents with    Allergies     Patient with ER visit 05/2024. Symptoms included itching mainly on upper arms and chest, hives all over the body, lip swelling, etc. No trouble breathing or chest pain. Was given steroids, Pepcid, and benadryl in the ER. No antihistamines within the last 5 days.      Patient is a 69-year-old female who presents for allergy evaluation upon referral of her PCP, Dr.' Ahumada   Prior note from visit with PCP from 8/28/2024 reviewed and appreciated noting concern for an allergic reaction.  PCP notes symptoms onset of itching at approximately 6 PM.  Patient had eaten taco chips and carrots prior to that.  By 2 AM patient noted to have hives and lip swelling.    Prior ed May 2024 :    Review of records show normal CBC.    Immunizations reviewed.  COVID-vaccine x 3 doses last in 2021  Last flu vaccine on record from 2020  Pneumonia vaccine up-to-date from 2020    Today patient reports    Allergies /allergic reaction   Duration: May 2024   At a grad party that day  Itching started after filing up at gas station 630pm  No nuts or seafood    Got home , had  2 beers,   Stayed up to 1130pm  Went to bed   Feel asleep . Awoken with hives, itching  Went back to bed  for 2 hrs  Got up at 330-4am  to use bathroom   Hives on neck and lip swelling   Tried col compresses  Denied resp issues   Friend took to ed   Ed: pred., pepcid. Benadryl . No epi   Timing: interim  Symptoms: itchy skin  on arms and chest, hives, lip swelling    Severity: moderate   Went to ed   Denies oral swelling or respiratory issues.  Status:denies current symptoms   Triggers:?   Tried:benadryl   Pets: denies   Preceding fever bite sting infection or new medication: denies   Preceding alcohol exercise or NSAID: + etoh that night   No new foods   Ok with beer since     + smoker     Pt is retired ED RN     Hx of asthma, ad, or food allergy:  denies        Bactrim : 2016: infected pimple on face  Itching on day 6/7     Has epipen      HISTORY:  Past Medical History:    AAA (abdominal aortic aneurysm) (HCC)    CT Feb 2013; 2.5 cm AAA    Adrenal adenoma    CT Feb 2013 with 11 mm left adrenal adenoma    Age-related nuclear cataract of both eyes    Alternating esotropia    Anisometropia    Ankle fracture, right    Anxiety state    Back problem    Carpal tunnel syndrome on left    Cataract    Depression    Diverticulitis    Diverticulosis of large intestine    Macular pucker    right eye    Meibomian gland dysfunction (MGD) of both eyes    Osteoarthritis    Osteoarthritis of lumbar spine    PONV (postoperative nausea and vomiting)    Poor dentition    Rotator cuff tear, left    s/p repair ; Ortho Dr Velasquez    Smoking    Visual impairment      Past Surgical History:   Procedure Laterality Date    Arthroscopy of joint unlisted      Colonoscopy  8/11/16    Dr. Duran    Colonoscopy N/A 3/9/2017    Procedure: COLONOSCOPY;  Surgeon: Neptali Duran MD;  Location: [facility] ENDOSCOPY    Colonoscopy      Hip replacement surgery Right 01/2018    Knee arthroscopy      right knee 1970s    Other Left 1998    L rotator cuff repair    Other surgical history  8/17/14    8 dental implants    Total hip replacement Right     Wrist arthroscop,release xvers lig Left 01/28/2020    Left endoscopic carpal tunnel release      Family History   Problem Relation Age of Onset    Cancer Father         colon cancer    Heart Disorder Mother     Diabetes Mother     Cancer Brother         primary unknown    Breast Cancer Maternal Aunt     Ovarian Cancer Neg     DCIS Neg     LCIS Neg     BRCA gene + Neg     Glaucoma Neg     Macular degeneration Neg       Social History:   Social History     Socioeconomic History    Marital status: Single   Tobacco Use    Smoking status: Every Day     Current packs/day: 0.75     Average packs/day: 0.8 packs/day for 47.2 years (35.4 ttl pk-yrs)     Types: Cigarettes      Start date: 6/16/1977    Smokeless tobacco: Never   Vaping Use    Vaping status: Never Used   Substance and Sexual Activity    Alcohol use: Yes     Alcohol/week: 3.0 standard drinks of alcohol     Types: 3 Standard drinks or equivalent per week     Comment: Occasionally    Drug use: No    Sexual activity: Not Currently     Partners: Female   Other Topics Concern    Caffeine Concern Yes     Comment: Coffee: 3 cups daily    Exercise No   Social History Narrative    The patient does not use an assistive device..      The patient does live in a home with stairs.        Medications (Active prior to today's visit):  Current Outpatient Medications   Medication Sig Dispense Refill    Vitamin D, Cholecalciferol, 25 MCG (1000 UT) Oral Tab Take 1 tablet by mouth daily.      psyllium (METAMUCIL SMOOTH TEXTURE) 28 % Oral Powd Pack Take 1 packet by mouth daily.      Multiple Vitamin (ONE-DAILY MULTI VITAMINS) Oral Tab Take 1 tablet by mouth daily.         Allergies:  Allergies   Allergen Reactions    Bactrim [Sulfamethoxazole W/Trimethoprim] RASH         ROS:     Allergic/Immuno:  See HPI  Cardiovascular:  Negative for irregular heartbeat/palpitations, chest pain, edema  Constitutional:  Negative night sweats,weight loss, irritability and lethargy  Endocrine:  Negative for cold intolerance, polydipsia and polyphagia  ENMT:  Negative for ear drainage, hearing loss and nasal drainage + lip swelling   Eyes:  Negative for eye discharge and vision loss  Gastrointestinal:  Negative for abdominal pain, diarrhea and vomiting  Genitourinary:  Negative for dysuria and hematuria  Hema/Lymph:  Negative for easy bleeding and easy bruising  Integumentary:   see hpi   Musculoskeletal:  Negative for joint symptoms  Neurological:  Negative for dizziness, seizures  Psychiatric:  Negative for inappropriate interaction and psychiatric symptoms  Respiratory:  Negative for cough, dyspnea and wheezing      PHYSICAL EXAM:   Constitutional: responsive,  no acute distress noted  Head/Face: NC/Atraumatic  Eyes/Vision: conjunctiva and lids are normal extraocular motion is intact   Ears/Audiometry: tympanic membranes are normal bilaterally hearing is grossly intact  Nose/Mouth/Throat: nose and throat are clear mucous membranes are moist   Neck/Thyroid: neck is supple without adenopathy  Lymphatic: no abnormal cervical, supraclavicular or axillary adenopathy is noted  Respiratory: normal to inspection lungs are clear to auscultation bilaterally normal respiratory effort   Cardiovascular: regular rate and rhythm no murmurs, gallups, or rubs  Abdomen: soft non-tender non-distended  Skin/Hair: no unusual rashes present  Extremities: no edema, cyanosis, or clubbing  Neurological:Oriented to time, place, person & situation       ASSESSMENT/PLAN:   Assessment   Encounter Diagnoses   Name Primary?    Acute urticaria Yes    Allergic reaction, initial encounter     Lip swelling     Angioedema, initial encounter        Skin testing to aeroallergesn was + to mold     Skin testing today to common food allergens including milk egg wheat soy almond walnut peanut was negative     + hist control       #1 Allergic reaction  Hives and lip swelling in May 2024   No reoccurrence  Has epipen   Patient had consumed alcohol that day including beer.  Patient has since consumed beer without issues.  No nuts or shellfish that day.  No recurrence of symptoms no ACE inhibitors.    See above skin testing to screen for environmental or common food allergen triggers    Handouts on allergic reactions urticaria and angioedema provided and reviewed  Should symptoms return EpiPen is up-to-date.  If milder symptoms may try Xyzal 5 mg or Zyrtec 10 mg once a day up to 4 times per day if needed  Patient can be posted of any reoccurrence  If reoccurrence will consider C4 level    I spent 45 minutes on the day of the encounter related to this visit, not including separately reported activities or procedures.   This may have included non face-to-face time activities such as same day chart review in preparing to see the patient, orders, documentation in the electronic medical record, and/or communication with other healthcare professionals or family members/caregivers.         Orders This Visit:  No orders of the defined types were placed in this encounter.      Meds This Visit:  Requested Prescriptions      No prescriptions requested or ordered in this encounter       Imaging & Referrals:  None     8/22/2024  Christiano Tracy MD      If medication samples were provided today, they were provided solely for patient education and training related to self administration of these medications.  Teaching, instruction and sample was provided to the patient by myself.  Teaching included  a review of potential adverse side effects as well as potential efficacy.  Patient's questions were answered in regards to medication administration and dosing and potential side effects. Teaching was provided via the teach back method

## 2024-08-22 NOTE — PATIENT INSTRUCTIONS
#1 Allergic reaction  Hives and lip swelling in May 2024   No reoccurrence  Has epipen   Patient had consumed alcohol that day including beer.  Patient has since consumed beer without issues.  No nuts or shellfish that day.  No recurrence of symptoms no ACE inhibitors.    See above skin testing to screen for environmental or common food allergen triggers    Handouts on allergic reactions urticaria and angioedema provided and reviewed  Should symptoms return EpiPen is up-to-date.  If milder symptoms may try Xyzal 5 mg or Zyrtec 10 mg once a day up to 4 times per day if needed  Patient can be posted of any reoccurrence  If reoccurrence will consider C4 level

## 2024-12-05 ENCOUNTER — TELEPHONE (OUTPATIENT)
Dept: INTERNAL MEDICINE CLINIC | Facility: CLINIC | Age: 69
End: 2024-12-05

## 2024-12-05 NOTE — TELEPHONE ENCOUNTER
Called and spoke spoke with pt who will keep MA as scheduled.    Pt stated she will discuss all current issues with  at her appointment    Pt stated her friend had Covid 6 months ago an d patient has since had \" a scratchy throat\". Pt has tested negative for Covid x 2 since. Denied any other symptoms of upper respiratory infection    Pt also has Rt shoulder and neck pain 1 (1-10) intermittent since     Pt advised if she is concerned with her symptoms or if symptoms progress, she can seek evaluation at the

## 2024-12-05 NOTE — TELEPHONE ENCOUNTER
Pt. Called and scheduled her MA for 12/30/24.  (First available).  Pt. Then stated she has been having some issues with right shoulder and neck pain since October.  She also mentions that she has had a sore throat on and off since September.  Pt. States that generally she just does not feel right.  Pt. Asking if ok to wait until 12/30 with these symptoms or should she be seen sooner?

## 2024-12-30 ENCOUNTER — OFFICE VISIT (OUTPATIENT)
Dept: INTERNAL MEDICINE CLINIC | Facility: CLINIC | Age: 69
End: 2024-12-30

## 2024-12-30 ENCOUNTER — LAB ENCOUNTER (OUTPATIENT)
Dept: LAB | Age: 69
End: 2024-12-30
Attending: INTERNAL MEDICINE
Payer: MEDICARE

## 2024-12-30 VITALS
BODY MASS INDEX: 37.56 KG/M2 | OXYGEN SATURATION: 98 % | SYSTOLIC BLOOD PRESSURE: 118 MMHG | HEART RATE: 86 BPM | TEMPERATURE: 98 F | WEIGHT: 220 LBS | DIASTOLIC BLOOD PRESSURE: 56 MMHG | HEIGHT: 64 IN

## 2024-12-30 DIAGNOSIS — R35.89 POLYURIA: ICD-10-CM

## 2024-12-30 DIAGNOSIS — L50.9 URTICARIA: ICD-10-CM

## 2024-12-30 DIAGNOSIS — R26.9 GAIT ABNORMALITY: ICD-10-CM

## 2024-12-30 DIAGNOSIS — Z12.31 VISIT FOR SCREENING MAMMOGRAM: ICD-10-CM

## 2024-12-30 DIAGNOSIS — I71.40 ABDOMINAL AORTIC ANEURYSM (AAA) WITHOUT RUPTURE, UNSPECIFIED PART (HCC): ICD-10-CM

## 2024-12-30 DIAGNOSIS — E78.00 HYPERCHOLESTEREMIA: ICD-10-CM

## 2024-12-30 DIAGNOSIS — F17.200 SMOKING: ICD-10-CM

## 2024-12-30 DIAGNOSIS — G56.03 BILATERAL CARPAL TUNNEL SYNDROME: ICD-10-CM

## 2024-12-30 DIAGNOSIS — D12.6 ADENOMATOUS POLYP OF COLON, UNSPECIFIED PART OF COLON: ICD-10-CM

## 2024-12-30 DIAGNOSIS — Z96.641 HISTORY OF RIGHT HIP REPLACEMENT: ICD-10-CM

## 2024-12-30 DIAGNOSIS — E55.9 VITAMIN D DEFICIENCY: ICD-10-CM

## 2024-12-30 DIAGNOSIS — K57.30 DIVERTICULOSIS OF COLON: ICD-10-CM

## 2024-12-30 DIAGNOSIS — Z00.00 PHYSICAL EXAM, ANNUAL: Primary | ICD-10-CM

## 2024-12-30 DIAGNOSIS — M47.816 OSTEOARTHRITIS OF LUMBAR SPINE, UNSPECIFIED SPINAL OSTEOARTHRITIS COMPLICATION STATUS: ICD-10-CM

## 2024-12-30 LAB
ALBUMIN SERPL-MCNC: 4.7 G/DL (ref 3.2–4.8)
ALBUMIN/GLOB SERPL: 1.8 {RATIO} (ref 1–2)
ALP LIVER SERPL-CCNC: 70 U/L
ALT SERPL-CCNC: 33 U/L
ANION GAP SERPL CALC-SCNC: 8 MMOL/L (ref 0–18)
AST SERPL-CCNC: 26 U/L (ref ?–34)
BASOPHILS # BLD AUTO: 0.05 X10(3) UL (ref 0–0.2)
BASOPHILS NFR BLD AUTO: 0.5 %
BILIRUB SERPL-MCNC: 0.5 MG/DL (ref 0.2–1.1)
BILIRUB UR QL: NEGATIVE
BUN BLD-MCNC: 8 MG/DL (ref 9–23)
BUN/CREAT SERPL: 11.9 (ref 10–20)
CALCIUM BLD-MCNC: 10 MG/DL (ref 8.7–10.4)
CHLORIDE SERPL-SCNC: 104 MMOL/L (ref 98–112)
CHOLEST SERPL-MCNC: 173 MG/DL (ref ?–200)
CLARITY UR: CLEAR
CO2 SERPL-SCNC: 27 MMOL/L (ref 21–32)
COLOR UR: COLORLESS
CREAT BLD-MCNC: 0.67 MG/DL
DEPRECATED RDW RBC AUTO: 45.1 FL (ref 35.1–46.3)
EGFRCR SERPLBLD CKD-EPI 2021: 93 ML/MIN/1.73M2 (ref 60–?)
EOSINOPHIL # BLD AUTO: 0.09 X10(3) UL (ref 0–0.7)
EOSINOPHIL NFR BLD AUTO: 1 %
ERYTHROCYTE [DISTWIDTH] IN BLOOD BY AUTOMATED COUNT: 12.7 % (ref 11–15)
FASTING PATIENT LIPID ANSWER: YES
FASTING STATUS PATIENT QL REPORTED: YES
GLOBULIN PLAS-MCNC: 2.6 G/DL (ref 2–3.5)
GLUCOSE BLD-MCNC: 108 MG/DL (ref 70–99)
GLUCOSE UR-MCNC: NORMAL MG/DL
HCT VFR BLD AUTO: 43.2 %
HDLC SERPL-MCNC: 75 MG/DL (ref 40–59)
HGB BLD-MCNC: 14.3 G/DL
HGB UR QL STRIP.AUTO: NEGATIVE
IMM GRANULOCYTES # BLD AUTO: 0.04 X10(3) UL (ref 0–1)
IMM GRANULOCYTES NFR BLD: 0.4 %
KETONES UR-MCNC: NEGATIVE MG/DL
LDLC SERPL CALC-MCNC: 88 MG/DL (ref ?–100)
LEUKOCYTE ESTERASE UR QL STRIP.AUTO: NEGATIVE
LYMPHOCYTES # BLD AUTO: 2.72 X10(3) UL (ref 1–4)
LYMPHOCYTES NFR BLD AUTO: 29.5 %
MCH RBC QN AUTO: 31.6 PG (ref 26–34)
MCHC RBC AUTO-ENTMCNC: 33.1 G/DL (ref 31–37)
MCV RBC AUTO: 95.4 FL
MONOCYTES # BLD AUTO: 0.89 X10(3) UL (ref 0.1–1)
MONOCYTES NFR BLD AUTO: 9.7 %
NEUTROPHILS # BLD AUTO: 5.42 X10 (3) UL (ref 1.5–7.7)
NEUTROPHILS # BLD AUTO: 5.42 X10(3) UL (ref 1.5–7.7)
NEUTROPHILS NFR BLD AUTO: 58.9 %
NITRITE UR QL STRIP.AUTO: NEGATIVE
NONHDLC SERPL-MCNC: 98 MG/DL (ref ?–130)
OSMOLALITY SERPL CALC.SUM OF ELEC: 287 MOSM/KG (ref 275–295)
PH UR: 6 [PH] (ref 5–8)
PLATELET # BLD AUTO: 267 10(3)UL (ref 150–450)
POTASSIUM SERPL-SCNC: 4.3 MMOL/L (ref 3.5–5.1)
PROT SERPL-MCNC: 7.3 G/DL (ref 5.7–8.2)
PROT UR-MCNC: NEGATIVE MG/DL
RBC # BLD AUTO: 4.53 X10(6)UL
SODIUM SERPL-SCNC: 139 MMOL/L (ref 136–145)
SP GR UR STRIP: <1.005 (ref 1–1.03)
TRIGL SERPL-MCNC: 51 MG/DL (ref 30–149)
TSI SER-ACNC: 2.68 UIU/ML (ref 0.55–4.78)
UROBILINOGEN UR STRIP-ACNC: NORMAL
VLDLC SERPL CALC-MCNC: 8 MG/DL (ref 0–30)
WBC # BLD AUTO: 9.2 X10(3) UL (ref 4–11)

## 2024-12-30 PROCEDURE — 81003 URINALYSIS AUTO W/O SCOPE: CPT

## 2024-12-30 PROCEDURE — 85025 COMPLETE CBC W/AUTO DIFF WBC: CPT

## 2024-12-30 PROCEDURE — 80053 COMPREHEN METABOLIC PANEL: CPT

## 2024-12-30 PROCEDURE — 80061 LIPID PANEL: CPT

## 2024-12-30 PROCEDURE — 36415 COLL VENOUS BLD VENIPUNCTURE: CPT

## 2024-12-30 PROCEDURE — 84443 ASSAY THYROID STIM HORMONE: CPT

## 2024-12-30 NOTE — PROGRESS NOTES
Asia Jefferson is a 69 year old female.    HPI:     Chief Complaint   Patient presents with    Physical     Reviewed Preventative/Wellness form with patient. Right shoulder, neck, and flank pain 3/10 off/on since April/July   chief complaint: presents for both AWV and follow up for chronic conditions and/or medication refills,      70 y/o F here for subsequent Medicare annual wellness exam; had Allergic reaction with hives and lip swelling in May 2024; No reoccurrence; Has epipen; saw allergist Dr Tracy; Gait abnormality; c/o balance problems; requests physical therapy; has had intermittent right flank pain, though less severe now; c/o pain to right neck area; nor arm weakness;  no CP; no SOB; no headaches; no palpitation            HISTORY:  Past Medical History:    AAA (abdominal aortic aneurysm) (HCC)    CT Feb 2013; 2.5 cm AAA    Adrenal adenoma    CT Feb 2013 with 11 mm left adrenal adenoma    Age-related nuclear cataract of both eyes    Alternating esotropia    Anisometropia    Ankle fracture, right    Anxiety state    Back problem    Carpal tunnel syndrome on left    Cataract    Depression    Diverticulitis    Diverticulosis of large intestine    Macular pucker    right eye    Meibomian gland dysfunction (MGD) of both eyes    Osteoarthritis    Osteoarthritis of lumbar spine    PONV (postoperative nausea and vomiting)    Poor dentition    Rotator cuff tear, left    s/p repair ; Ortho Dr Velasquez    Smoking    Visual impairment      Past Surgical History:   Procedure Laterality Date    Arthroscopy of joint unlisted      Colonoscopy  8/11/16    Dr. Duran    Colonoscopy N/A 3/9/2017    Procedure: COLONOSCOPY;  Surgeon: Neptali Duran MD;  Location: [facility] ENDOSCOPY    Colonoscopy      Hip replacement surgery Right 01/2018    Knee arthroscopy      right knee 1970s    Other Left 1998    L rotator cuff repair    Other surgical history  8/17/14    8 dental implants    Total hip replacement Right     Wrist  arthroscop,release xvers lig Left 01/28/2020    Left endoscopic carpal tunnel release      Family History   Problem Relation Age of Onset    Cancer Father         colon cancer    Heart Disorder Mother     Diabetes Mother     Cancer Brother         primary unknown    Breast Cancer Maternal Aunt     Ovarian Cancer Neg     DCIS Neg     LCIS Neg     BRCA gene + Neg     Glaucoma Neg     Macular degeneration Neg       Social History:   Social History     Socioeconomic History    Marital status: Single   Tobacco Use    Smoking status: Every Day     Current packs/day: 0.75     Average packs/day: 0.8 packs/day for 47.5 years (35.7 ttl pk-yrs)     Types: Cigarettes     Start date: 6/16/1977    Smokeless tobacco: Never   Vaping Use    Vaping status: Never Used   Substance and Sexual Activity    Alcohol use: Yes     Alcohol/week: 3.0 standard drinks of alcohol     Types: 3 Standard drinks or equivalent per week     Comment: Occasionally    Drug use: No    Sexual activity: Not Currently     Partners: Female   Other Topics Concern    Caffeine Concern Yes     Comment: Coffee: 3 cups daily    Exercise No   Social History Narrative    The patient does not use an assistive device..      The patient does live in a home with stairs.        Medications (Active prior to today's visit):  Current Outpatient Medications   Medication Sig Dispense Refill    psyllium (METAMUCIL SMOOTH TEXTURE) 28 % Oral Powd Pack Take 1 packet by mouth daily.      Multiple Vitamin (ONE-DAILY MULTI VITAMINS) Oral Tab Take 1 tablet by mouth daily.         Allergies:  Allergies[1]        General Health     In the past six months, have you lost more than 10 pounds without trying?: 2 - No    Has your appetite been poor?: No    Type of Diet: Balanced    How does the patient maintain a good energy level?: Stretching    How would you describe your daily physical activity?: Moderate    How would you describe your current health state?: Fair    How do you maintain  positive mental well-being?: Puzzles;Games;Visiting Friends;Visiting Family         Have you had any immunizations at another office such as Influenza, Hepatitis B, Tetanus, or Pneumococcal?: Yes     Functional Ability     Bathing or Showering: Able without help    Toileting: Able without help    Dressing: Able without help    Eating: Able without help    Driving: Able without help    Preparing your meals: Able without help    Managing money/bills: Able without help    Taking medications as prescribed: Able without help    Are you able to afford your medications?: Yes    Hearing Problems?: No     Functional Status     Hearing Problems?: No    Vision Problems? : No    Difficulty walking?: Yes    Difficulty dressing or bathing?: No    Problems with daily activities? : No    Memory Problems?: No      Fall/Risk Assessment          Have you fallen in the last 12 months?: 0-No                              Fall/Risk Scorin           Depression Screening (PHQ-2/PHQ-9): Over the LAST 2 WEEKS                 1. Little interest or pleasure in doing things: Several days  2. Feeling down, depressed, or hopeless: Several days  3.    4.    5.    6.    7.    8.    9.              Advance Directives     Do you have a healthcare power of ?: No    Do you have a living will?: No     Hearing Assessment (Required for AWV/SWV)      Hearing Screening    Screening Method: Questionnaire  I have a problem hearing over the telephone: No I have trouble following the conversations when two or more people are talking at the same time: No   I have trouble understanding things on the TV: No I have to strain to understand conversations: No   I have to worry about missing the telephone ring or doorbell: No I have trouble hearing conversations in a noisy background such as a crowded room or restaurant: Sometimes   I get confused about where sounds come from: No I misunderstand some words in a sentence and need to ask people to repeat  themselves: No   I especially have trouble understanding the speech of women and children: No I have trouble understanding the speaker in a large room such as at a meeting or place of Quaker: No   Many people I talk to seem to mumble (or don't speak clearly): No People get annoyed because I misunderstand what they say: No   I misunderstand what others are saying and make inappropriate responses: No I avoid social activities because I cannot hear well and fear I will reply improperly: No   Family members and friends have told me they think I may have hearing loss: No             Visual Acuity     Right Eye Visual Acuity: Corrected Left Eye Visual Acuity: Corrected   Right Eye Chart Acuity: 20/40 Left Eye Chart Acuity: 20/40     Cognitive Assessment     What day of the week is this?: Correct    What month is it?: Correct    What year is it?: Correct    Recall \"Ball\": Correct    Recall \"Flag\": Correct    Recall \"Tree\": Correct        Asia Jefferson's SCREENING SCHEDULE   Tests on this list are recommended by your physician but may not be covered, or covered at this frequency, by your insurer. Please check with your insurance carrier before scheduling to verify coverage.   PREVENTATIVE SERVICES  INDICATIONS AND SCHEDULE Internal Lab or Procedure External Lab or Procedure   Diabetes Screening      HbgA1C   Annually No results found for: \"A1C\"      No data to display                Fasting Blood Sugar (FSB)Annually Glucose (mg/dL)   Date Value   12/30/2024 108 (H)         No data to display               Cardiovascular Disease Screening     LDL Annually LDL Cholesterol (mg/dL)   Date Value   12/30/2024 88        EKG One Time     Colorectal Cancer Screening      Colonoscopy Screen every 10 years Health Maintenance   Topic Date Due    Colorectal Cancer Screening  03/09/2020    Update Health Maintenance if applicable    Flex Sigmoidoscopy Screen every 5 years No results found for this or any previous visit.      No  data to display                 Fecal Occult Blood Annually No results found for: \"FOB\", \"OCCULTSTOOL\"      No data to display                Glaucoma Screening      Ophthalmology Visit Annually     Bone Density Screening      Dexascan Every two years No results found for this or any previous visit.        No data to display               Pap and Pelvic      Pap: Every 3 yrs age 21-65 or Pap+HPV every 5 yrs age 30-65, age 65 and older at high risk   No recommendations at this time Update Health Maintenance if applicable    Chlamydia  Annually if high risk No results found for: \"CHLAMYDIA\"      No data to display                Screening Mammogram      Mammogram  Annually Health Maintenance   Topic Date Due    Mammogram  02/09/2020    Update Health Maintenance if applicable   Immunizations      Influenza No orders found for this or any previous visit. Update Immunization Activity if applicable    Pneumococcal Orders placed or performed in visit on 11/09/20    PNEUMOCOCCAL IMM, 23   Orders placed or performed in visit on 12/18/19    PNEUMOCOCCAL VACC, 13 ABELARDO IM    Update Immunization Activity if applicable    Hepatitis B No orders found for this or any previous visit. Update Immunization Activity if applicable    Tetanus No orders found for this or any previous visit. Update Immunization Activity if applicable    Zoster (Not covered by Medicare Part B) No orders found for this or any previous visit. Update Immunization Activity if applicable     SPECIFIC DISEASE MONITORING Internal Lab or Procedure External Lab or Procedure   Annual Monitoring of Persistent     Medications (ACE/ARB, digoxin, diuretics)    Potassium  Annually Potassium (mmol/L)   Date Value   12/30/2024 4.3     POTASSIUM (P) (mmol/L)   Date Value   06/16/2014 3.9         No data to display                Creatinine  Annually Creatinine (mg/dL)   Date Value   12/30/2024 0.67         No data to display                Digoxin Serum Conc  Annually No  results found for: \"DIGOXIN\"      No data to display                Diabetes      HgbA1C  Annually No results found for: \"A1C\"      No data to display                Creat/alb ratio  Annually      LDL  Annually LDL Cholesterol (mg/dL)   Date Value   12/30/2024 88         No data to display                 Dilated Eye exam  Annually      No data to display                   No data to display                COPD      Spirometry Testing Annually No results found for this or any previous visit.      No data to display                          ROS:   Constitutional: no weight loss; no fatigue  ENMT:  Negative for ear drainage, hearing loss and nasal drainage  Eyes:  Negative for eye discharge and vision loss  Cardiovascular:  Negative for chest pain; negative palpitations  Respiratory:  Negative for cough, dyspnea and wheezing  Endocrine:  Negative for abnormal sleep patterns, increased activity, polydipsia and polyphagia  Gastrointestinal:  Negative for abdominal pain, constipation, decreased appetite, diarrhea and vomiting; no melena or hematochezia  Musculoskeletal:  Negative for arthralgias or myalgias  Genitourinary:  Negative for dysuria or polyuria  Hema/Lymph:  Negative for easy bleeding and easy bruising  Integumentary:  Negative for pruritus and rash  Neurological:  Negative for gait disturbance; negative for paresthesias   All other review of systems are negative.        PHYSICAL EXAM:   Blood pressure 118/56, pulse 86, temperature 98.2 °F (36.8 °C), height 5' 4\" (1.626 m), weight 220 lb (99.8 kg), SpO2 98%.  Constitutional: alert and oriented x3 in no acute distress  HEENT- EOMI, PERRL  Nose/Mouth/Throat: pharynx without erythema; no oral lesions  Neck/Thyroid: neck supple; no thyromegaly  Lymphatics: no lymphadenopathy of neck or groin  Cardiovascular: RRR, S1, S2, no S3 or murmur  Respiratory: lungs without crackles or wheezes  Abdomen: normoactive bowel sounds, soft, non-tender and  non-distended  Extremities: no clubbing, cyanosis or edema  Vascular: no carotid bruits; DP/PT 2/2  Musculoskeletal: Motor 5/5 upper and lower extremities  Neurological: cranial nerves II-XII intact; light touch and proprioception intact  Skin: no rash or ulcerations         ASSESSMENT/PLAN:   Physical exam  last PAP was around 2001; discussed PAP--> she will consider; Prevnar 13 given 12/18/19; Pneumovax given Nov 2020; had flu vax Oct 2024    Urticaria  Had Allergic reaction with Hives and lip swelling in May 2024   No reoccurrence  Has epipen  -saw allergist Dr Tracy      Carpal tunnel syndrome  S/p left carpal release by Dr Mello in Jan 2020;     History of Right hip replacement  s/p right hip THR under care of orthopedics Dr Nguyen on 1/15/18 at [facility]; using cane to ambulate;  No longer taking ibuprofen 200 mg po qD     Gait abnormality  -c/o balance problems; advised for physical therapy    Smoking  Denies dyspnea; advised cessation; now 1/2 - 3/4 ppd; UA in Feb 2017 neg hematuria; does not wish to take bupropion, or Chantix; check UA     Osteoarthritis of lumbar spine  XR lumbar spine in 2013 shows moderate multilevel DJD; pain responds to Advil prn; repeat XR lumbar spine in May 2017 shows moderate multilevel DJD     Diverticulosis  Seen on colonoscopy in Aug 2016     Colon polyps  S/p colonoscopy in Aug 2016 with six polyps found;  repeat colonoscopy in March 2017 with three polyps removed; next colonoscopy in 3 years (or March 2020) per Dr Neptali Duran     AAA (abdominal aortic aneurysm)  Pt had CT Abd in Feb 2013 which showed 2.5 cm AAA; U/S aorta in July 2014 showed ectasia mild dilatation aneurysmal distal abdominal aorta measures 3.2 x 3.1 cm; pt defers repeat testing for now     Poor dentition  Now has full dentures on top and bottom with dental implants     Screening mammogram  Mammogram in Jan 2019 was normal; mammogram ordered     Hypercholesterolemia   in May 2022      Vitamin D deficiency  Level 22 in May 2022; advise Vitamin D 1000 IU po qD     Right eye cataract  Saw optometrist in [place]     Depression  mild and episodic; declines anti-depressant     Right shoulder pain  Likely DJD vs rotator cuff arthropathy;  XR imaging ordered; pt given referral to orthopedics , Dr Jovi Menard, though not pursued        Cell [phone]      Spent 30 minutes obtaining history, evaluating patient, discussing treatment options, diet, exercise, review of available labs and radiology reports, and completing documentation.        Orders This Visit:  Orders Placed This Encounter   Procedures    CBC With Differential With Platelet    Comp Metabolic Panel (14)    Lipid Panel    TSH W Reflex To Free T4    Urinalysis, Routine       Meds This Visit:  Requested Prescriptions      No prescriptions requested or ordered in this encounter       Imaging & Referrals:  PHYSICAL THERAPY EXTERNAL  [facility]ENA 2D+3D SCREENING BILAT (CPT=77067/64646)     12/30/2024  Isael Boogie MD               [1]   Allergies  Allergen Reactions    Bactrim [Sulfamethoxazole W/Trimethoprim] RASH

## 2025-01-22 ENCOUNTER — APPOINTMENT (OUTPATIENT)
Dept: CT IMAGING | Facility: HOSPITAL | Age: 70
End: 2025-01-22
Attending: EMERGENCY MEDICINE
Payer: MEDICARE

## 2025-01-22 ENCOUNTER — APPOINTMENT (OUTPATIENT)
Dept: MRI IMAGING | Facility: HOSPITAL | Age: 70
End: 2025-01-22
Attending: EMERGENCY MEDICINE
Payer: MEDICARE

## 2025-01-22 ENCOUNTER — HOSPITAL ENCOUNTER (EMERGENCY)
Facility: HOSPITAL | Age: 70
Discharge: HOME | End: 2025-01-23
Attending: EMERGENCY MEDICINE
Payer: MEDICARE

## 2025-01-22 VITALS
OXYGEN SATURATION: 95 % | DIASTOLIC BLOOD PRESSURE: 45 MMHG | HEIGHT: 64 IN | RESPIRATION RATE: 21 BRPM | HEART RATE: 94 BPM | BODY MASS INDEX: 37.56 KG/M2 | WEIGHT: 220 LBS | TEMPERATURE: 98 F | SYSTOLIC BLOOD PRESSURE: 105 MMHG

## 2025-01-22 DIAGNOSIS — E04.1 THYROID NODULE: ICD-10-CM

## 2025-01-22 DIAGNOSIS — R91.8 LUNG NODULES: ICD-10-CM

## 2025-01-22 DIAGNOSIS — I71.43 INFRARENAL ABDOMINAL AORTIC ANEURYSM (AAA) WITHOUT RUPTURE: ICD-10-CM

## 2025-01-22 DIAGNOSIS — I67.1: ICD-10-CM

## 2025-01-22 DIAGNOSIS — E27.9 ADRENAL NODULE (HCC): ICD-10-CM

## 2025-01-22 DIAGNOSIS — I67.1: Primary | ICD-10-CM

## 2025-01-22 DIAGNOSIS — M25.511 ACUTE PAIN OF RIGHT SHOULDER: ICD-10-CM

## 2025-01-22 LAB
ANION GAP SERPL CALC-SCNC: 9 MMOL/L (ref 0–18)
BASOPHILS # BLD AUTO: 0.03 X10(3) UL (ref 0–0.2)
BASOPHILS NFR BLD AUTO: 0.3 %
BUN BLD-MCNC: 8 MG/DL (ref 9–23)
BUN/CREAT SERPL: 12.1 (ref 10–20)
CALCIUM BLD-MCNC: 10.1 MG/DL (ref 8.7–10.4)
CHLORIDE SERPL-SCNC: 104 MMOL/L (ref 98–112)
CO2 SERPL-SCNC: 23 MMOL/L (ref 21–32)
CREAT BLD-MCNC: 0.66 MG/DL
D DIMER PPP FEU-MCNC: 1.34 UG/ML FEU (ref ?–0.71)
DEPRECATED RDW RBC AUTO: 44.1 FL (ref 35.1–46.3)
EGFRCR SERPLBLD CKD-EPI 2021: 94 ML/MIN/1.73M2 (ref 60–?)
EOSINOPHIL # BLD AUTO: 0.08 X10(3) UL (ref 0–0.7)
EOSINOPHIL NFR BLD AUTO: 0.9 %
ERYTHROCYTE [DISTWIDTH] IN BLOOD BY AUTOMATED COUNT: 12.8 % (ref 11–15)
GLUCOSE BLD-MCNC: 121 MG/DL (ref 70–99)
HCT VFR BLD AUTO: 41.4 %
HGB BLD-MCNC: 13.9 G/DL
IMM GRANULOCYTES # BLD AUTO: 0.03 X10(3) UL (ref 0–1)
IMM GRANULOCYTES NFR BLD: 0.3 %
LYMPHOCYTES # BLD AUTO: 2.31 X10(3) UL (ref 1–4)
LYMPHOCYTES NFR BLD AUTO: 25.6 %
MCH RBC QN AUTO: 31.5 PG (ref 26–34)
MCHC RBC AUTO-ENTMCNC: 33.6 G/DL (ref 31–37)
MCV RBC AUTO: 93.9 FL
MONOCYTES # BLD AUTO: 0.73 X10(3) UL (ref 0.1–1)
MONOCYTES NFR BLD AUTO: 8.1 %
NEUTROPHILS # BLD AUTO: 5.83 X10 (3) UL (ref 1.5–7.7)
NEUTROPHILS # BLD AUTO: 5.83 X10(3) UL (ref 1.5–7.7)
NEUTROPHILS NFR BLD AUTO: 64.8 %
OSMOLALITY SERPL CALC.SUM OF ELEC: 282 MOSM/KG (ref 275–295)
PLATELET # BLD AUTO: 251 10(3)UL (ref 150–450)
POTASSIUM SERPL-SCNC: 3.9 MMOL/L (ref 3.5–5.1)
RBC # BLD AUTO: 4.41 X10(6)UL
SODIUM SERPL-SCNC: 136 MMOL/L (ref 136–145)
TROPONIN I SERPL HS-MCNC: 3 NG/L
WBC # BLD AUTO: 9 X10(3) UL (ref 4–11)

## 2025-01-22 PROCEDURE — 93010 ELECTROCARDIOGRAM REPORT: CPT

## 2025-01-22 PROCEDURE — 80048 BASIC METABOLIC PNL TOTAL CA: CPT | Performed by: EMERGENCY MEDICINE

## 2025-01-22 PROCEDURE — 85025 COMPLETE CBC W/AUTO DIFF WBC: CPT | Performed by: EMERGENCY MEDICINE

## 2025-01-22 PROCEDURE — 74177 CT ABD & PELVIS W/CONTRAST: CPT | Performed by: EMERGENCY MEDICINE

## 2025-01-22 PROCEDURE — 36415 COLL VENOUS BLD VENIPUNCTURE: CPT

## 2025-01-22 PROCEDURE — 71260 CT THORAX DX C+: CPT | Performed by: EMERGENCY MEDICINE

## 2025-01-22 PROCEDURE — 93005 ELECTROCARDIOGRAM TRACING: CPT

## 2025-01-22 PROCEDURE — 70551 MRI BRAIN STEM W/O DYE: CPT | Performed by: EMERGENCY MEDICINE

## 2025-01-22 PROCEDURE — 84484 ASSAY OF TROPONIN QUANT: CPT | Performed by: EMERGENCY MEDICINE

## 2025-01-22 PROCEDURE — 94640 AIRWAY INHALATION TREATMENT: CPT

## 2025-01-22 PROCEDURE — 99285 EMERGENCY DEPT VISIT HI MDM: CPT

## 2025-01-22 PROCEDURE — 85379 FIBRIN DEGRADATION QUANT: CPT | Performed by: EMERGENCY MEDICINE

## 2025-01-22 PROCEDURE — 70498 CT ANGIOGRAPHY NECK: CPT | Performed by: EMERGENCY MEDICINE

## 2025-01-22 PROCEDURE — 70496 CT ANGIOGRAPHY HEAD: CPT | Performed by: EMERGENCY MEDICINE

## 2025-01-22 RX ORDER — LIDOCAINE 50 MG/G
1 PATCH TOPICAL EVERY 24 HOURS
Qty: 14 PATCH | Refills: 0 | Status: SHIPPED | OUTPATIENT
Start: 2025-01-22

## 2025-01-22 RX ORDER — ACETAMINOPHEN 500 MG
1000 TABLET ORAL ONCE
Status: COMPLETED | OUTPATIENT
Start: 2025-01-22 | End: 2025-01-22

## 2025-01-22 RX ORDER — ACETAMINOPHEN 325 MG/1
650 TABLET ORAL EVERY 6 HOURS PRN
Qty: 30 TABLET | Refills: 0 | Status: SHIPPED | OUTPATIENT
Start: 2025-01-22

## 2025-01-22 RX ORDER — IPRATROPIUM BROMIDE AND ALBUTEROL SULFATE 2.5; .5 MG/3ML; MG/3ML
3 SOLUTION RESPIRATORY (INHALATION) ONCE
Status: COMPLETED | OUTPATIENT
Start: 2025-01-22 | End: 2025-01-22

## 2025-01-23 ENCOUNTER — TELEPHONE (OUTPATIENT)
Dept: SURGERY | Facility: CLINIC | Age: 70
End: 2025-01-23

## 2025-01-23 ENCOUNTER — TELEPHONE (OUTPATIENT)
Facility: CLINIC | Age: 70
End: 2025-01-23

## 2025-01-23 ENCOUNTER — TELEPHONE (OUTPATIENT)
Dept: INTERNAL MEDICINE CLINIC | Facility: CLINIC | Age: 70
End: 2025-01-23

## 2025-01-23 LAB
ATRIAL RATE: 107 BPM
P AXIS: 71 DEGREES
P-R INTERVAL: 156 MS
Q-T INTERVAL: 330 MS
QRS DURATION: 80 MS
QTC CALCULATION (BEZET): 440 MS
R AXIS: 80 DEGREES
T AXIS: 53 DEGREES
VENTRICULAR RATE: 107 BPM

## 2025-01-23 NOTE — ED INITIAL ASSESSMENT (HPI)
Pt arrives via ems from home with c/o tingling to her right side of upper shoulder to the base of her skull. Pt states this is intermitted pain ever since she fell on Saturday.    Denies thinners, or hitting her head at that time

## 2025-01-23 NOTE — ED PROVIDER NOTES
Is Zoraida [facility] Department Note  Patient: Asia Jefferson Age: 69 year old Sex: female      MRN: [id number]  : 1955    Patient Seen in: [facility] Emergency Department    History     Chief Complaint   Patient presents with    Pain     Stated Complaint: arm pain    History obtained from: patient     This is a 69-year-old female with multiple medical problems including lumbar osteoarthritis, known intra-abdominal aortic aneurysm diagnosed in , presents to the ER for evaluation of upper back and right shoulder pain, feeling off balance, dizziness, stiffness and numbness in the right side of her neck.  Patient states she has been noticing pain in her right upper back and shoulder for about the past week and a half, worse with certain movements.  States that 5 days ago she was bringing groceries into her house but when she stepped on the cement she lost her balance and grabbed onto a railing, falling onto her knees.  She hit her head against her hand on the railing but did not actually hit her head against the railing itself nor pass out.  States has since been having pain and some stiffness in the left side of her neck as well as numbness and tingling in the right side of her neck that occurred earlier today.  States this is associated with the ongoing right shoulder pain that goes up to the base of her skull.  Additionally she notes she has a history of \"balance problems\" but feels she has been more dizzy and off balance since her fall that feels like it is constant though improved slightly when she sits down.  She denies any other areas of numbness weakness or tingling her extremities.  She denies chest pain or shortness of breath.  Denies abdominal pain, vomiting, diarrhea, fevers, dysuria, hematuria.  No leg swelling or calf pain.  No recent travel.  No history of DVT or PE.    Review of Systems:  Review of Systems  Positive for stated complaint: arm pain. Constitutional and vital signs  reviewed. All other systems reviewed and negative except as noted above.    Patient History:  Past Medical History:    AAA (abdominal aortic aneurysm) (HCC)    CT Feb 2013; 2.5 cm AAA    Adrenal adenoma    CT Feb 2013 with 11 mm left adrenal adenoma    Age-related nuclear cataract of both eyes    Alternating esotropia    Anisometropia    Ankle fracture, right    Anxiety state    Back problem    Carpal tunnel syndrome on left    Cataract    Depression    Diverticulitis    Diverticulosis of large intestine    Macular pucker    right eye    Meibomian gland dysfunction (MGD) of both eyes    Osteoarthritis    Osteoarthritis of lumbar spine    PONV (postoperative nausea and vomiting)    Poor dentition    Rotator cuff tear, left    s/p repair ; Ortho Dr Velasquez    Smoking    Visual impairment       Past Surgical History:   Procedure Laterality Date    Arthroscopy of joint unlisted      Colonoscopy  8/11/16    Dr. Duran    Colonoscopy N/A 3/9/2017    Procedure: COLONOSCOPY;  Surgeon: Neptali Duran MD;  Location: [facility] ENDOSCOPY    Colonoscopy      Hip replacement surgery Right 01/2018    Knee arthroscopy      right knee 1970s    Other Left 1998    L rotator cuff repair    Other surgical history  8/17/14    8 dental implants    Total hip replacement Right     Wrist arthroscop,release xvers lig Left 01/28/2020    Left endoscopic carpal tunnel release        Family History   Problem Relation Age of Onset    Cancer Father         colon cancer    Heart Disorder Mother     Diabetes Mother     Cancer Brother         primary unknown    Breast Cancer Maternal Aunt     Ovarian Cancer Neg     DCIS Neg     LCIS Neg     BRCA gene + Neg     Glaucoma Neg     Macular degeneration Neg        Specific Social Determinants of Health:   Social History     Socioeconomic History    Marital status: Single   Tobacco Use    Smoking status: Every Day     Current packs/day: 0.75     Average packs/day: 0.8 packs/day for 47.6 years (35.7 ttl pk-yrs)      Types: Cigarettes     Start date: 6/16/1977    Smokeless tobacco: Never   Vaping Use    Vaping status: Never Used   Substance and Sexual Activity    Alcohol use: Yes     Alcohol/week: 3.0 standard drinks of alcohol     Types: 3 Standard drinks or equivalent per week     Comment: Occasionally    Drug use: No    Sexual activity: Not Currently     Partners: Female   Other Topics Concern    Caffeine Concern Yes     Comment: Coffee: 3 cups daily    Exercise No   Social History Narrative    The patient does not use an assistive device..      The patient does live in a home with stairs.           PSFH elements reviewed from today and agreed except as otherwise stated in HPI.    Physical Exam     ED Triage Vitals [01/22/25 1803]   BP (!) 166/84   Pulse 117   Resp 18   Temp 98 °F (36.7 °C)   Temp src Temporal   SpO2 97 %   O2 Device None (Room air)       Current:/45   Pulse 94   Temp 98 °F (36.7 °C) (Temporal)   Resp 21   Ht 162.6 cm (5' 4\")   Wt 99.8 kg   SpO2 95%   BMI 37.76 kg/m²         Physical Exam  Vitals and nursing note reviewed.   Constitutional:       General: She is not in acute distress.     Appearance: She is not ill-appearing.   HENT:      Head: Normocephalic and atraumatic.      Right Ear: External ear normal.      Left Ear: External ear normal.      Nose: Nose normal.      Mouth/Throat:      Mouth: Mucous membranes are moist.      Pharynx: Oropharynx is clear.   Eyes:      Extraocular Movements: Extraocular movements intact.      Conjunctiva/sclera: Conjunctivae normal.      Pupils: Pupils are equal, round, and reactive to light.   Cardiovascular:      Rate and Rhythm: Normal rate and regular rhythm.      Heart sounds: No murmur heard.     Comments: +2 radial and DP pulses bilaterally  Pulmonary:      Effort: Pulmonary effort is normal. No respiratory distress.      Breath sounds: No wheezing or rales.   Abdominal:      General: There is no distension.      Palpations: Abdomen is soft.       Tenderness: There is no abdominal tenderness. There is no guarding or rebound.   Musculoskeletal:         General: No deformity.      Cervical back: Neck supple.      Comments: Patient has full range of motion of her bilateral shoulders.  She has mild reproducible tenderness to the right trapezius.  No swelling or erythema to the right shoulder joint.  Compartments are soft in the right upper extremity.  There is no midline CT or L-spine tenderness, no step-off nor deformity.     Skin:     General: Skin is warm and dry.      Capillary Refill: Capillary refill takes less than 2 seconds.   Neurological:      General: No focal deficit present.      Mental Status: She is alert.      Comments: Ambulatory with steady gait.  Finger-nose intact bilaterally.  Strength is 5/5 on bilateral handgrip, elbow flexion and extension, hip flexion, knee flexion extension and ankle plantar and dorsiflexion.  Sensation tact light touch symmetric bilateral upper and lower extremities.  +2 patellar reflexes bilaterally.   Psychiatric:         Mood and Affect: Mood normal.         ED Course   Labs:   Labs Reviewed   BASIC METABOLIC PANEL (8) - Abnormal; Notable for the following components:       Result Value    Glucose 121 (*)     BUN 8 (*)     All other components within normal limits   D-DIMER - Abnormal; Notable for the following components:    D-Dimer 1.34 (*)     All other components within normal limits   TROPONIN I HIGH SENSITIVITY - Normal   CBC WITH DIFFERENTIAL WITH PLATELET   RAINBOW DRAW LAVENDER   RAINBOW DRAW LIGHT GREEN   RAINBOW DRAW BLUE     Radiology findings:    CT ABDOMEN+PELVIS(CONTRAST ONLY)(CPT=74177)    Result Date: 1/22/2025  CONCLUSION:  1. A 5.5 cm infrarenal abdominal aortic aneurysm.  Vascular surgery consult recommended. 2. A 1.4 cm left adrenal nodule is incompletely characterized but probably represents adenoma which measured 1 cm in 2013. New line marked hepatic steatosis. 3. No acute finding.  1.    Dictated by (CST): Prateek Ruelas MD on 1/22/2025 at 9:28 PM     Finalized by (CST): Prateek Ruelas MD on 1/22/2025 at 9:35 PM          CT CHEST PE AORTA (IV ONLY) (CPT=71260)    Result Date: 1/22/2025  CONCLUSION:  1. No pulmonary embolus or aortic dissection. 2. Emphysema with 3 left upper lobe nodules measuring each about 6 mm.  Follow-up CT in 6 months recommended. 3. Mild bronchitis. 4. 2.7 cm left thyroid nodule.  Based on ACR guidelines for incidental thyroid nodules measuring greater than 1.5 cm a baseline thyroid ultrasound is recommended if not previously performed. 5. Marked hepatic steatosis. 6. Mild-to-moderate coronary artery calcification. 7. A 1.4 cm incompletely characterized left adrenal nodule measured 1 cm in 2013 and probably represents a adenoma.     Dictated by (CST): Prateek Ruelas MD on 1/22/2025 at 9:16 PM     Finalized by (CST): Prateek Ruelas MD on 1/22/2025 at 9:28 PM          CTA BRAIN + CTA CAROTIDS (CPT=70496/90584)    Result Date: 1/22/2025  CONCLUSION:  1. No major vessel occlusion or hemodynamically significant stenosis. 2. 12 mm right MCA bifurcation aneurysm. 3. 4 mm pericallosal  anterior cerebral artery aneurysm. 4. Fenestrated anterior communicating artery with questionable 3 mm left paramedian aneurysm. 5. Chronic left basal ganglionic and internal capsule lacunar infarcts. 6. Changes of chronic small vessel disease in cerebral white matter. 7. No acute intracranial finding. 8. Findings were called to Dr. Stovall.     Dictated by (CST): Prateek Ruelas MD on 1/22/2025 at 8:56 PM     Finalized by (CST): Prateek Ruelas MD on 1/22/2025 at 9:13 PM           EKG as interpreted by me: My interpretation of EKG shows sinus tachycardia rate 107 beats minute, normal axis, normal intervals, QTc 440 ms, no STEMI.  Cardiac Monitor: Interpreted by me.   Pulse Readings from Last 1 Encounters:   01/22/25 94   , sinus,         MDM   This patient presents with couple complaints including  dizziness and balance problems, pain in her left neck, tingling in the right side of her shoulder and neck, with atraumatic right shoulder pain.  Patient tachycardic on arrival to the ER satting well on room air, hypertensive.    Differential diagnoses considered includes, but is not limited to:   Traumatic intracranial bleeding in the setting of head injury  C-spine injury versus arterial dissection in the setting of dizziness, neck pain, numbness onset after her fall  Possibly posterior circulation stroke given concern for issues with her balance though she has no cerebellar signs this was considered  Patient has a documented history of intra-abdominal aortic aneurysm, her atraumatic shoulder pain seems atypical, slightly reproducible but other consideration given her tachycardia on arrival includes PE, thoracic aortic aneurysm or arterial dissection.  Considered but low suspicion for ACS or PE.    Will obtain the following tests: cbc, bmp, trop, dimer   Will administer the following medications/therapies: cta head and neck, mri brain, poss chest imaging pending d-dimer     Please see ED course for my independent review of these tests/imaging results.    Chronic conditions affecting care: lumbar osteoarthritis, known intra-abdominal aortic aneurysm diagnosed in 2013,    ED Course as of 01/23/25 0136  ------------------------------------------------------------  Time: 01/22 1837  Comment: My interpretation of EKG shows sinus tachycardia rate 107 beats minute, normal axis, normal intervals, QTc 440 ms, no STEMI.  ------------------------------------------------------------  Time: 01/22 1922  Value: D-Dimer(!): 1.34  Comment: Dimer elevated. Will obtain PE study  ------------------------------------------------------------  Time: 01/22 2112  Comment: Pt with multiple aneurysms on CT. R MCA bifurcation 12mm. Probably needs neurosurgery and neurovascular as outpatient per radiologist.    ------------------------------------------------------------  Time: 01/22 2148  Value: CT ABDOMEN+PELVIS(CONTRAST ONLY)(CPT=74177)  Comment:      Impression  CONCLUSION:  1. A 5.5 cm infrarenal abdominal aortic aneurysm.  Vascular surgery consult recommended.  2. A 1.4 cm left adrenal nodule is incompletely characterized but probably represents adenoma which measured 1 cm in 2013. New line marked hepatic steatosis.  3. No acute finding.     ------------------------------------------------------------  Time: 01/22 2148  Comment:      Impression  CONCLUSION:  1. No pulmonary embolus or aortic dissection.  2. Emphysema with 3 left upper lobe nodules measuring each about 6 mm.  Follow-up CT in 6 months recommended.  3. Mild bronchitis.  4. 2.7 cm left thyroid nodule.  Based on ACR guidelines for incidental thyroid nodules measuring greater than 1.5 cm a baseline thyroid ultrasound is recommended if not previously performed.  5. Marked hepatic steatosis.  6. Mild-to-moderate coronary artery calcification.  7. A 1.4 cm incompletely characterized left adrenal nodule measured 1 cm in 2013 and probably represents a adenoma.         ------------------------------------------------------------  Time: 01/22 2148  Comment:   Impression  CONCLUSION:  1. No major vessel occlusion or hemodynamically significant stenosis.  2. 12 mm right MCA bifurcation aneurysm.  3. 4 mm pericallosal  anterior cerebral artery aneurysm.  4. Fenestrated anterior communicating artery with questionable 3 mm left paramedian aneurysm.  5. Chronic left basal ganglionic and internal capsule lacunar infarcts.  6. Changes of chronic small vessel disease in cerebral white matter.  7. No acute intracranial finding.  8. Findings were called to Dr. Stovall.    ------------------------------------------------------------  Time: 01/22 8633  Comment: Patient reassessed.  She has been ambulatory here.  Vital signs have improved.  Complaining of increased pain to  her right shoulder for which we will give Tylenol and lidocaine patch.  I discussed at length with her her results including findings of multiple small intracranial aneurysms.  Discussed with her finding of enlarged intra abdominal aortic aneurysm as well as multiple nodules seen in the adrenal, thyroid and lung.  I advise close follow-up with her primary doctor regarding these incidental findings and to follow-up otherwise with vascular surgery and neurosurgery regarding aneurysms.  Return if new or worsening symptoms occur immediately.  She feels comfortable with plan for discharge.            Procedures:  Procedures        Disposition and Plan     Clinical Impression:  1. Anterior cerebral artery aneurysm (HCC)    2. Aneurysm of middle cerebral artery (HCC)    3. Infrarenal abdominal aortic aneurysm (AAA) without rupture (HCC)    4. Lung nodules    5. Thyroid nodule    6. Adrenal nodule (HCC)    7. Acute pain of right shoulder        Disposition:  Discharge    Follow-up:  Isael Boogie MD  [address] [id number] [phone]    Schedule an appointment as soon as possible for a visit in 2 day(s)      [facility] Emergency Department  [address] [address]  [phone]  Go to  If symptoms worsen, immediately    Enoch Smith MD  [facility] [facility] [address] [facility] [address]  [place] [address]  [phone]    Call in 1 day(s)      Najjar, Samer F, MD  [address] [facility] [address] [facility]  [address]  [place] [address]  [phone]    Call in 1 day(s)        Medications Prescribed:  Discharge Medication List as of 1/22/2025 11:53 PM        START taking these medications    Details   lidocaine 5 % External Patch Place 1 patch onto the skin daily., Normal, Disp-14 patch, R-0      acetaminophen (TYLENOL) 325 MG Oral Tab Take 2 tablets (650 mg total) by mouth every 6 (six) hours as needed., Normal, Disp-30 tablet, R-0               This note may have been created using voice dictation technology and  may include inadvertent errors.      Luz Maria Stovall,   Attending Physician   Emergency Medicine

## 2025-01-23 NOTE — DISCHARGE INSTRUCTIONS
Thank you for seeking care at [facility] Emergency Department.  You have been seen and evaluated. Your workup today showed an abdominal aortic aneurysm and multiple small intracranial aneurysms. Your imaging incidentally showed thyroid nodules and lung nodules which need to be followed up with your primary doctor.    We discussed the results of your workup   Please read the instructions provided   If given prescriptions, take as instructed    Remember, your care process does not end after your visit today. Please follow-up with your doctor within 1-2 days for a follow-up check to ensure you are  improving, to see if you need any further evaluation/testing, or to evaluate for any alternate diagnoses.     Please return to the emergency department immediately if you develop chest pain, difficulty breathing, abdominal pain, inability to drink liquids without vomiting, one sided numbness or weakness, slurred speech, severe headache, or if you develop any other new or concerning symptoms as these could be signs of more serious medical illness.    We hope you feel better.

## 2025-01-23 NOTE — TELEPHONE ENCOUNTER
Noted that patient has contacted office, asking if she is to see Dr. Smith sooner than scheduled appointment, as multiple aneurysms were noted on CTA Brain and Carotids that were completed on 1.22.25.    Patient is currently scheduled:    2/6/2025 8:00 AM Enoch Smith MD     Routed to STEVEN Aguirre.

## 2025-01-23 NOTE — TELEPHONE ENCOUNTER
Received ER Consult request for Dr. Najjar to see patient regarding aneurysm.    Called and spoke with patient. Offered appointments. She accepted Thursday 01/30/2025. Gave her office information, she wrote appointment and office address down. Let her know if she needs anything to give us a call. She was receptive.

## 2025-01-23 NOTE — TELEPHONE ENCOUNTER
Patient was referred by the ER to schedule appointment with Dr. Santiago for Anterior cerebral artery aneurysm. Appointment was made for 2/6/25 @ 8am. Please advise if patient should be seen sooner? If sooner appointment is needed she would prefer afternoon appointments

## 2025-01-23 NOTE — TELEPHONE ENCOUNTER
Patient is calling she was in the ER on 1/22, she was told to follow up in 2 days.    There are no openings can she be added?    Phone [phone]

## 2025-01-23 NOTE — TELEPHONE ENCOUNTER
Called and left message for patient to call tomorrow morning at 8am to discuss appointment for tomorrow per Dr. Boogie's message.

## 2025-01-24 ENCOUNTER — TELEPHONE (OUTPATIENT)
Dept: SURGERY | Facility: CLINIC | Age: 70
End: 2025-01-24

## 2025-01-24 ENCOUNTER — OFFICE VISIT (OUTPATIENT)
Dept: INTERNAL MEDICINE CLINIC | Facility: CLINIC | Age: 70
End: 2025-01-24

## 2025-01-24 VITALS
DIASTOLIC BLOOD PRESSURE: 70 MMHG | HEIGHT: 64 IN | WEIGHT: 219.19 LBS | SYSTOLIC BLOOD PRESSURE: 128 MMHG | HEART RATE: 86 BPM | TEMPERATURE: 98 F | BODY MASS INDEX: 37.42 KG/M2 | OXYGEN SATURATION: 98 %

## 2025-01-24 DIAGNOSIS — E04.1 THYROID NODULE: ICD-10-CM

## 2025-01-24 DIAGNOSIS — F17.200 SMOKING: ICD-10-CM

## 2025-01-24 DIAGNOSIS — R91.8 LUNG NODULES: ICD-10-CM

## 2025-01-24 DIAGNOSIS — I67.1 BRAIN ANEURYSM (HCC): Primary | ICD-10-CM

## 2025-01-24 DIAGNOSIS — I71.40 ABDOMINAL AORTIC ANEURYSM (AAA) WITHOUT RUPTURE, UNSPECIFIED PART: ICD-10-CM

## 2025-01-24 PROCEDURE — 99214 OFFICE O/P EST MOD 30 MIN: CPT | Performed by: INTERNAL MEDICINE

## 2025-01-24 PROCEDURE — G2211 COMPLEX E/M VISIT ADD ON: HCPCS | Performed by: INTERNAL MEDICINE

## 2025-01-24 RX ORDER — IBUPROFEN 600 MG/1
600 TABLET, FILM COATED ORAL EVERY 8 HOURS PRN
Qty: 90 TABLET | Refills: 0 | Status: SHIPPED | OUTPATIENT
Start: 2025-01-24

## 2025-01-24 NOTE — TELEPHONE ENCOUNTER
----- Message from Enoch Smith sent at 1/24/2025  9:41 AM CST -----  Please call the patient for an earlier appointment

## 2025-01-24 NOTE — TELEPHONE ENCOUNTER
Called patient to schedule sooner appointment with Dr. Smith. Ok to overbook per Dr. CASSIDY Will attempt to call patient again. Please assist patient if she calls back.

## 2025-01-24 NOTE — PROGRESS NOTES
Asia Jefferson is a 69 year old female.    HPI:     Chief Complaint   Patient presents with    Follow - Up     Follow up to ED visit 1-22. Diagnosed with new cerebral aneurysm.  C/o intermittent dizziness and unsteady gait.  History of pain to Rt shoulder, Rt side of neck and upper back for past 7 months.        68 y/o F with +smoking who presents to the ED 1/22/25 for evaluation of upper back and right shoulder pain, feeling off balance, dizziness, stiffness and numbness in the right side of her neck. She underwent CTA brain which showed a 12 mm right MCA bifurcation aneurysm, and a 3. 4 mm pericallosal  anterior cerebral artery aneurysm, and  a fenestrated anterior communicating artery with questionable 3 mm left paramedian aneurysm.  She also underwent CT abdomen which showed that her known AAA had increased to 5.5 cm in diameter; denies abdominal pain;  no CP; no SOB; no headaches; no palpitation        HISTORY:  Past Medical History:    AAA (abdominal aortic aneurysm) (HCC)    CT Feb 2013; 2.5 cm AAA    Adrenal adenoma    CT Feb 2013 with 11 mm left adrenal adenoma    Age-related nuclear cataract of both eyes    Alternating esotropia    Anisometropia    Ankle fracture, right    Anxiety state    Back problem    Carpal tunnel syndrome on left    Cataract    Depression    Diverticulitis    Diverticulosis of large intestine    Macular pucker    right eye    Meibomian gland dysfunction (MGD) of both eyes    Osteoarthritis    Osteoarthritis of lumbar spine    PONV (postoperative nausea and vomiting)    Poor dentition    Rotator cuff tear, left    s/p repair ; Ortho Dr Velasquez    Smoking    Visual impairment      Past Surgical History:   Procedure Laterality Date    Arthroscopy of joint unlisted      Colonoscopy  8/11/16    Dr. Duran    Colonoscopy N/A 3/9/2017    Procedure: COLONOSCOPY;  Surgeon: Neptali Duran MD;  Location: [facility] ENDOSCOPY    Colonoscopy      Hip replacement surgery Right 01/2018    Knee  arthroscopy      right knee 1970s    Other Left 1998    L rotator cuff repair    Other surgical history  8/17/14    8 dental implants    Total hip replacement Right     Wrist arthroscop,release xvers lig Left 01/28/2020    Left endoscopic carpal tunnel release      Family History   Problem Relation Age of Onset    Cancer Father         colon cancer    Heart Disorder Mother     Diabetes Mother     Cancer Brother         primary unknown    Breast Cancer Maternal Aunt     Ovarian Cancer Neg     DCIS Neg     LCIS Neg     BRCA gene + Neg     Glaucoma Neg     Macular degeneration Neg       Social History:   Social History     Socioeconomic History    Marital status: Single   Tobacco Use    Smoking status: Every Day     Current packs/day: 0.75     Average packs/day: 0.8 packs/day for 47.6 years (35.7 ttl pk-yrs)     Types: Cigarettes     Start date: 6/16/1977     Passive exposure: Current    Smokeless tobacco: Never   Vaping Use    Vaping status: Never Used   Substance and Sexual Activity    Alcohol use: Yes     Alcohol/week: 3.0 standard drinks of alcohol     Types: 3 Standard drinks or equivalent per week     Comment: Occasionally    Drug use: No    Sexual activity: Not Currently     Partners: Female   Other Topics Concern    Caffeine Concern Yes     Comment: Coffee: 3 cups daily    Exercise No   Social History Narrative    The patient does not use an assistive device..      The patient does live in a home with stairs.        Medications (Active prior to today's visit):  Current Outpatient Medications   Medication Sig Dispense Refill    ibuprofen 600 MG Oral Tab Take 1 tablet (600 mg total) by mouth every 8 (eight) hours as needed for Pain. 90 tablet 0    lidocaine 5 % External Patch Place 1 patch onto the skin daily. 14 patch 0    acetaminophen (TYLENOL) 325 MG Oral Tab Take 2 tablets (650 mg total) by mouth every 6 (six) hours as needed. 30 tablet 0    psyllium (METAMUCIL SMOOTH TEXTURE) 28 % Oral Powd Pack Take 1  packet by mouth daily.      Multiple Vitamin (ONE-DAILY MULTI VITAMINS) Oral Tab Take 1 tablet by mouth daily. (Patient not taking: Reported on 1/24/2025)         Allergies:  Allergies[1]              ROS:   Constitutional: no weight loss; no fatigue  Cardiovascular:  Negative for chest pain; negative palpitations  Respiratory:  Negative for cough, dyspnea and wheezing  Gastrointestinal:  Negative for abdominal pain, constipation, decreased appetite, diarrhea and vomiting; no melena or hematochezia  All other review of systems are negative.        PHYSICAL EXAM:   Blood pressure 128/70, pulse 86, temperature 98.1 °F (36.7 °C), temperature source Oral, height 5' 4\" (1.626 m), weight 219 lb 3.2 oz (99.4 kg), SpO2 98%.  Constitutional: alert and oriented x3 in no acute distress  HEENT- EOMI, PERRL  Nose/Mouth/Throat: pharynx without erythema; no oral lesions  Neck/Thyroid: neck supple; no thyromegaly  Cardiovascular: RRR, S1, S2, no S3 or murmur  Respiratory: lungs without crackles or wheezes  Abdomen: normoactive bowel sounds, soft, non-tender and non-distended  Extremities: no clubbing, cyanosis or edema           ASSESSMENT/PLAN:   Brain aneurysm  Seen in ED 1/22/25 for dizziness and neck pain; CTA brain showed 12 mm right MCA bifurcation aneurysm, and a 3. 4 mm pericallosal  anterior cerebral artery aneurysm, and  a fenestrated anterior communicating artery with questionable 3 mm left paramedian aneurysm.  -no headache;   -pt referred to Dr Smith for neurosurgical consultation; appt 1/27/25    AAA (abdominal aortic aneurysm)  Pt had CT Abd in Feb 2013 which showed 2.5 cm AAA; U/S aorta in July 2014 showed ectasia mild dilatation aneurysmal distal abdominal aorta measures 3.2 x 3.1 cm; pt defers repeat testing for now  -repeat CT abdomen 1/22/25 shows 5.5 cm infrarenal abdominal aortic aneurysm.    -seeing PV Dr Najjar on 1/30/25    Cervicalgia  On Tylenol prn, or Motrin 600 mg po [id number] prn; dicussed XR  C-spine, though pt declines for now    Right shoulder pain  Likely DJD vs rotator cuff arthropathy;  XR imaging ordered; pt given referral to orthopedics , Dr Jovi Menard, though not pursued    Health Maintenance  last PAP was around 2001; discussed PAP--> she will consider; Prevnar 13 given 12/18/19; Pneumovax given Nov 2020; had flu vax Oct 2024     Urticaria  Had Allergic reaction with Hives and lip swelling in May 2024   No reoccurrence  Has epipen  -saw allergist Dr Tracy      Carpal tunnel syndrome  S/p left carpal release by Dr Mello in Jan 2020;     History of Right hip replacement  s/p right hip THR under care of orthopedics Dr Nguyen on 1/15/18 at [facility]; using cane to ambulate;  No longer taking ibuprofen 200 mg po qD     Gait abnormality  -c/o balance problems; advised for physical therapy     Smoking  Denies dyspnea; advised cessation; now 1/2 - 3/4 ppd; UA in Feb 2017 neg hematuria; does not wish to take bupropion, or Chantix; check UA  -strongly encouraged cessation; she will try nicotine patches     Osteoarthritis of lumbar spine  XR lumbar spine in 2013 shows moderate multilevel DJD; pain responds to Advil prn; repeat XR lumbar spine in May 2017 shows moderate multilevel DJD     Diverticulosis  Seen on colonoscopy in Aug 2016     Colon polyps  S/p colonoscopy in Aug 2016 with six polyps found;  repeat colonoscopy in March 2017 with three polyps removed; next colonoscopy in 3 years (or March 2020) per Dr Neptali Duran     Poor dentition  Now has full dentures on top and bottom with dental implants     Screening mammogram  Mammogram in Jan 2019 was normal; mammogram ordered     Hypercholesterolemia   in May 2022     Vitamin D deficiency  Level 22 in May 2022; advise Vitamin D 1000 IU po qD     Right eye cataract  Saw optometrist in [place]     Depression  mild and episodic; declines anti-depressant     Thyroid nodule  CT chest showed 2.7 cm left thyroid nodule.   -thyroid  ultrasound ordered     Lung nodules  CT chest in Jan 2025 shows 3 left upper lobe nodules measuring each about 6 mm   -repeat CT chest in 6 months, or July 2025       Cell [phone]      Spent 30 minutes obtaining history, evaluating patient, discussing treatment options, diet, exercise, review of available labs and radiology reports, and completing documentation.              Orders This Visit:  No orders of the defined types were placed in this encounter.      Meds This Visit:  Requested Prescriptions     Signed Prescriptions Disp Refills    ibuprofen 600 MG Oral Tab 90 tablet 0     Sig: Take 1 tablet (600 mg total) by mouth every 8 (eight) hours as needed for Pain.       Imaging & Referrals:  NEUROSURGERY - INTERNAL  US THYROID (CPT=[id number])     1/24/2025  Isael Boogie MD                 [1]   Allergies  Allergen Reactions    Bactrim [Sulfamethoxazole W/Trimethoprim] RASH

## 2025-01-24 NOTE — TELEPHONE ENCOUNTER
Called patient again to see if she was available to make appt. Left another voicemail to call back.

## 2025-01-24 NOTE — TELEPHONE ENCOUNTER
Pt was r/s with pt's PCP, no need to overbook, pt lives in [place], appt in [facility] and pt was scheduled for 1/27/2025 in [facility].

## 2025-01-27 ENCOUNTER — OFFICE VISIT (OUTPATIENT)
Dept: SURGERY | Facility: CLINIC | Age: 70
End: 2025-01-27
Payer: MEDICARE

## 2025-01-27 ENCOUNTER — TELEPHONE (OUTPATIENT)
Facility: CLINIC | Age: 70
End: 2025-01-27

## 2025-01-27 VITALS
DIASTOLIC BLOOD PRESSURE: 69 MMHG | WEIGHT: 219 LBS | SYSTOLIC BLOOD PRESSURE: 159 MMHG | HEART RATE: 92 BPM | HEIGHT: 64 IN | BODY MASS INDEX: 37.39 KG/M2 | OXYGEN SATURATION: 96 %

## 2025-01-27 DIAGNOSIS — D32.9 MENINGIOMA (HCC): ICD-10-CM

## 2025-01-27 DIAGNOSIS — I67.1 BRAIN ANEURYSM (HCC): Primary | ICD-10-CM

## 2025-01-27 DIAGNOSIS — R26.89 BALANCE DISORDER: ICD-10-CM

## 2025-01-27 NOTE — TELEPHONE ENCOUNTER
Left voicemail for patient that provider will be in surgery during time off appointment and we need to reschedule

## 2025-01-27 NOTE — PROGRESS NOTES
ER     Minimal dizziness  Crania aneurysm   Imbalance - years   Patient had a week ago  Tingling and numbness in rig  ht shoulder and pain   Patient went to ER     Neha rodriguez

## 2025-01-27 NOTE — H&P
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  She presented to the emergency department on 1/22/2025 with worsening of balance, dizziness, and right shoulder/neck pain and stiffness.  CTA head and neck 1/22/2025 demonstrates a 11 mm right middle cerebral artery bifurcation aneurysm and a 4 mm distal anterior cerebral artery aneurysm.  Limited 3 sequence MRI brain 1/22/2025 demonstrates possible 1 cm left greater sphenoid wing meningioma and no evidence of infarct.  CT chest abdomen pelvis demonstrates some small pulmonary nodules and an enlarged abdominal aortic aneurysm.  She reports that she was told that she will have serial imaging for the pulmonary nodules and she has a scheduled appointment with vascular surgery for her enlarging abdominal aortic aneurysm.  She reports active smoking.  She denies family history of aneurysm or subarachnoid hemorrhage.    PAST MEDICAL HISTORY:  Past Medical History:    AAA (abdominal aortic aneurysm)    CT Feb 2013; 2.5 cm AAA    Adrenal adenoma    CT Feb 2013 with 11 mm left adrenal adenoma    Age-related nuclear cataract of both eyes    Alternating esotropia    Anisometropia    Ankle fracture, right    Anxiety state    Back problem    Carpal tunnel syndrome on left    Cataract    Depression    Diverticulitis    Diverticulosis of large intestine    Macular pucker    right eye    Meibomian gland dysfunction (MGD) of both eyes     Osteoarthritis    Osteoarthritis of lumbar spine    PONV (postoperative nausea and vomiting)    Poor dentition    Rotator cuff tear, left    s/p repair ; Ortho Dr Velasquez    Smoking    Visual impairment       PAST SURGICAL HISTORY:  Past Surgical History:   Procedure Laterality Date    Arthroscopy of joint unlisted      Colonoscopy  8/11/16    Dr. Duran    Colonoscopy N/A 3/9/2017    Procedure: COLONOSCOPY;  Surgeon: Neptali Duran MD;  Location: [facility] ENDOSCOPY    Colonoscopy      Hip replacement surgery Right 01/2018    Knee arthroscopy      right knee 1970s    Other Left 1998    L rotator cuff repair    Other surgical history  8/17/14    8 dental implants    Total hip replacement Right     Wrist arthroscop,release xvers lig Left 01/28/2020    Left endoscopic carpal tunnel release       FAMILY HISTORY:  family history includes Breast Cancer in her maternal aunt; Cancer in her brother and father; Diabetes in her mother; Heart Disorder in her mother.    SOCIAL HISTORY:   reports that she has been smoking cigarettes. She started smoking about 47 years ago. She has a 35.7 pack-year smoking history. She has been exposed to tobacco smoke. She has never used smokeless tobacco. She reports current alcohol use of about 3.0 standard drinks of alcohol per week. She reports that she does not use drugs.    ALLERGIES:  Allergies[1]    MEDICATIONS:  Medications Ordered Prior to Encounter[2]    REVIEW OF SYSTEMS:  A 10-point system was reviewed.  Pertinent positives and negatives are noted in HPI.      PHYSICAL EXAMINATION:  VITAL SIGNS: /69 (BP Location: Left arm, Patient Position: Sitting, Cuff Size: large)   Pulse 92   Ht 64\"   Wt 219 lb (99.3 kg)   SpO2 96%   BMI 37.59 kg/m²     A&Ox3, no acute distress  PERRL, EOMi, FS, TM  Full strength x 4, no drift  Sensation intact   No hyperreflexia, no Leo's, no clonus  Stable casual unassisted gait    ASSESSMENT:  69-year-old female with incidental unruptured 11mm  right MCA and 4mm distal VIDYA aneurysms, an incidental 1cm left lateral sphenoid wing meningioma, and balance difficulty    I had an extensive discussion with the patient and her family at the appointment regarding the current clinical situation and plan of care.  We reviewed the imaging together.  We had an extensive discussion about the natural history of intracranial aneurysms.  We discussed the risks, benefits, alternatives, goals, and expectations of a diagnostic cerebral angiogram for further characterization of the aneurysm as well as all of the management options including observation as well as aneurysm treatment with endovascular or open surgical techniques.  We discussed the signs and symptoms for which to seek emergent medical attention.  We discussed the modifiable risk factors, including the most important one being smoking cessation which we discussed at length.  We discussed that further imaging evaluation could provide additional information to optimize her plan of care.  After this discussion and answering her questions, she expressed understanding and agreement with the plan.    Plan:  - MRA brain with vessel wall imaging to further evaluate her brain aneurysms  - MRI brain with and without contrast brain tumor protocol to fully characterize the possible left greater sphenoid wing meningioma  - MRI cervical to evaluate for cervical stenosis as a cause of longstanding progressive balance difficulty in the setting of chronic neck pain  - Follow-up in clinic in March for further discussion on plan of care  - Counseled extensively on smoking cessation, and should reach out to her primary care physician for further assistive measures  - Follow-up with vascular surgery for management of abdominal aortic aneurysm  - Follow-up with primary care physician for management of pulmonary nodules seen on imaging and for hypertension management    Enoch Smith MD  Neurological Surgery  [facility]  [facility]    Care Time: 90 min including face to face time, chart review, imaging interpretation, and coordination of care, and a greater than 5 minute discussion of smoking cessation         [1]   Allergies  Allergen Reactions    Bactrim [Sulfamethoxazole W/Trimethoprim] RASH   [2]   Current Outpatient Medications on File Prior to Visit   Medication Sig Dispense Refill    ibuprofen 600 MG Oral Tab Take 1 tablet (600 mg total) by mouth every 8 (eight) hours as needed for Pain. 90 tablet 0    lidocaine 5 % External Patch Place 1 patch onto the skin daily. 14 patch 0    acetaminophen (TYLENOL) 325 MG Oral Tab Take 2 tablets (650 mg total) by mouth every 6 (six) hours as needed. 30 tablet 0    psyllium (METAMUCIL SMOOTH TEXTURE) 28 % Oral Powd Pack Take 1 packet by mouth daily.      Multiple Vitamin (ONE-DAILY MULTI VITAMINS) Oral Tab Take 1 tablet by mouth daily. (Patient not taking: Reported on 1/24/2025)       No current facility-administered medications on file prior to visit.

## 2025-01-28 ENCOUNTER — TELEPHONE (OUTPATIENT)
Facility: CLINIC | Age: 70
End: 2025-01-28

## 2025-01-28 NOTE — TELEPHONE ENCOUNTER
Left voicemail for patient to please call the office back to see if patient would like a later time at 10:15 AM instead of 9:15 AM

## 2025-01-30 ENCOUNTER — OFFICE VISIT (OUTPATIENT)
Facility: CLINIC | Age: 70
End: 2025-01-30

## 2025-01-30 VITALS
DIASTOLIC BLOOD PRESSURE: 78 MMHG | BODY MASS INDEX: 37.39 KG/M2 | WEIGHT: 219 LBS | SYSTOLIC BLOOD PRESSURE: 138 MMHG | HEIGHT: 64 IN

## 2025-01-30 DIAGNOSIS — I71.42 JUXTARENAL ABDOMINAL AORTIC ANEURYSM (AAA) WITHOUT RUPTURE: Primary | ICD-10-CM

## 2025-01-30 PROCEDURE — 99204 OFFICE O/P NEW MOD 45 MIN: CPT | Performed by: SURGERY

## 2025-01-31 NOTE — PROGRESS NOTES
Samer F. Najjar, MD  Vascular Surgery  [facility]      VASCULAR SURGERY   CLINIC CONSULT NOTE        Name: Asia Jefferson   :   1955  [id number]     REFERRING PHYSICIAN:  No ref. provider found  PRIMARY CARE PHYSICIAN:  Isael Boogie MD    HISTORY OF PRESENT ILLNESS:   Patient is a 69 year old female who has been referred regarding management of a large abdominal aortic aneurysm that recently discovered when the patient was in the emergency room after a fall.  The patient describes experiencing periodic unsteady gait and right neck and shoulder with tingling.  She states that over the last few months she was has been having balance issues and she fell on her porch but denied falling hard.  She was brought to the emergency room at [place] where scanning revealed that she has 2 small brain aneurysms and a 5.6 cm infrarenal abdominal aortic aneurysm.  The study was not a CT angiogram however.  The aneurysm actually starts just below the right renal artery and if there appears to be more than 1.  The patient did also undergo a CT angiogram of the carotid arteries which revealed no stenosis.  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  She is monitoring her blood pressure and it has been slightly elevated at times.     IMAGING:            PAST MEDICAL HISTORY:    Past Medical History:    AAA (abdominal aortic aneurysm)    CT 2013; 2.5 cm AAA    Adrenal adenoma    CT 2013 with 11 mm left adrenal adenoma    Age-related nuclear cataract of both eyes    Alternating esotropia    Anisometropia    Ankle fracture, right    Anxiety state    Back problem    Carpal tunnel syndrome on left    Cataract    Depression    Diverticulitis    Diverticulosis of large intestine    Macular pucker    right eye    Meibomian gland dysfunction (MGD) of both eyes    Osteoarthritis    Osteoarthritis of lumbar spine    PONV (postoperative nausea and vomiting)    Poor dentition    Rotator cuff tear,  left    s/p repair ; Ortho Dr Velasquez    Smoking    Visual impairment       PAST SURGICAL HISTORY:   Past Surgical History:   Procedure Laterality Date    Arthroscopy of joint unlisted      Colonoscopy  8/11/16    Dr. Duran    Colonoscopy N/A 3/9/2017    Procedure: COLONOSCOPY;  Surgeon: Neptali Duran MD;  Location: [facility] ENDOSCOPY    Colonoscopy      Hip replacement surgery Right 01/2018    Knee arthroscopy      right knee 1970s    Other Left 1998    L rotator cuff repair    Other surgical history  8/17/14    8 dental implants    Total hip replacement Right     Wrist arthroscop,release xvers lig Left 01/28/2020    Left endoscopic carpal tunnel release        MEDICATIONS:     Current Outpatient Medications:     ibuprofen 600 MG Oral Tab, Take 1 tablet (600 mg total) by mouth every 8 (eight) hours as needed for Pain., Disp: 90 tablet, Rfl: 0    lidocaine 5 % External Patch, Place 1 patch onto the skin daily., Disp: 14 patch, Rfl: 0    acetaminophen (TYLENOL) 325 MG Oral Tab, Take 2 tablets (650 mg total) by mouth every 6 (six) hours as needed., Disp: 30 tablet, Rfl: 0    psyllium (METAMUCIL SMOOTH TEXTURE) 28 % Oral Powd Pack, Take 1 packet by mouth daily., Disp: , Rfl:     Multiple Vitamin (ONE-DAILY MULTI VITAMINS) Oral Tab, Take 1 tablet by mouth daily., Disp: , Rfl:     ALLERGIES:    She is allergic to bactrim [sulfamethoxazole w/trimethoprim].    SOCIAL HISTORY:    Patient  reports that she has been smoking cigarettes. She started smoking about 47 years ago. She has a 35.7 pack-year smoking history. She has been exposed to tobacco smoke. She has never used smokeless tobacco. She reports current alcohol use of about 3.0 standard drinks of alcohol per week. She reports that she does not use drugs.    FAMILY HISTORY:    Patient's family history includes Breast Cancer in her maternal aunt; Cancer in her brother and father; Diabetes in her mother; Heart Disorder in her mother.    ROS:     A 12 point review of  systems with pertinent positives and negatives listed in the HPI.    EXAM:    /78   Ht 5' 4\" (1.626 m)   Wt 219 lb (99.3 kg)   BMI 37.59 kg/m²   GENERAL: alert and orientated X 3, well developed, well nourished, in no apparent distress  PSYCH: normal mood and affect  HEENT: ears and throat are clear  NECK: supple, no lymphadenopathy, thyroid wnl  CAROTID: No bruits  RESPIRATORY: no rales, rhonchi, or wheezes B  CARDIO: RRR without murmur, no murmur, no gallop   ABDOMEN: soft, non-tender with no palpable aneurysm or masses  BACK: normal, no tenderness  SKIN: no rashes, warm and dry  EXTREMITIES: no tenderness  NEURO: no sensory or motor deficits  VASCULAR:      Femoral Popliteal DP PT Peroneal   Right 1+       palpable, weak palpable, weak    Left 1+       palpable, weak palpable, weak          IMAGING:   the CT scan of the abdomen was reviewed with the patient    ASSESSMENT  Diagnoses and all orders for this visit:    Juxtarenal abdominal aortic aneurysm (AAA) without rupture         I explained to the patient that I would recommend a CT angiogram of the abdomen and pelvis so that we can better define the origins of the renal arteries and decide whether an open or endovascular approach is appropriate.  From the available data so far it appears that she will require a fenestrated repair to include both renal arteries using the Z Qwickly Cook device.  I had a prolonged discussion regarding the necessity of smoking cessation if possible and the importance of controlling her blood pressure both of which are important in controlling his aneurysm growth and his other cardiovascular risk factors.  We have discussed the symptoms of aneurysm rupture with the importance of proceeding to the emergency room in that event.        PLAN:  CT angiogram of the abdomen pelvis.    The patient indicated an understanding of these issues and agreed to the plan and all questions were answered during the clinic visit.      Thank you  for allowing me to participate in your patient's care.   Please do not hesitate to contact me with any questions.    Sincerely,  Samer F. Najjar MD    Please note: Dragon speech recognition software was used to prepare this note. If a word or phrase is confusing, it is likely do to a failure of recognition.   Please contact me with any questions or clarifications.

## 2025-02-05 ENCOUNTER — HOSPITAL ENCOUNTER (OUTPATIENT)
Dept: ULTRASOUND IMAGING | Facility: HOSPITAL | Age: 70
Discharge: HOME | End: 2025-02-05
Attending: INTERNAL MEDICINE
Payer: MEDICARE

## 2025-02-05 DIAGNOSIS — E04.1 THYROID NODULE: ICD-10-CM

## 2025-02-05 PROCEDURE — 76536 US EXAM OF HEAD AND NECK: CPT | Performed by: INTERNAL MEDICINE

## 2025-02-08 ENCOUNTER — HOSPITAL ENCOUNTER (OUTPATIENT)
Dept: CT IMAGING | Facility: HOSPITAL | Age: 70
Discharge: HOME | End: 2025-02-08
Attending: SURGERY
Payer: MEDICARE

## 2025-02-08 DIAGNOSIS — I71.42 JUXTARENAL ABDOMINAL AORTIC ANEURYSM (AAA) WITHOUT RUPTURE: ICD-10-CM

## 2025-02-08 PROCEDURE — 74174 CTA ABD&PLVS W/CONTRAST: CPT | Performed by: SURGERY

## 2025-02-11 ENCOUNTER — TELEPHONE (OUTPATIENT)
Dept: INTERNAL MEDICINE CLINIC | Facility: CLINIC | Age: 70
End: 2025-02-11

## 2025-02-11 DIAGNOSIS — E04.1 THYROID NODULE: Primary | ICD-10-CM

## 2025-02-11 NOTE — TELEPHONE ENCOUNTER
Pt.called back.  She did receive the message.  Pt. Provided the OP scheduling number.  Pt. Will call and schedule now.

## 2025-02-11 NOTE — TELEPHONE ENCOUNTER
Ultrasound thyroid shows 3 cm left lobe TR 3 nodule.     FNA advised; order placed    Patient not available; LMVM; LMTCB; advised she C/B to confirm receipt of message

## 2025-02-17 ENCOUNTER — TELEPHONE (OUTPATIENT)
Facility: CLINIC | Age: 70
End: 2025-02-17

## 2025-02-17 NOTE — TELEPHONE ENCOUNTER
Called to schedule follow up appointment to discuss CT in further detail. No answer, left VM with callback information.

## 2025-02-21 ENCOUNTER — HOSPITAL ENCOUNTER (OUTPATIENT)
Dept: MRI IMAGING | Facility: HOSPITAL | Age: 70
Discharge: HOME | End: 2025-02-21
Attending: NEUROLOGICAL SURGERY
Payer: MEDICARE

## 2025-02-21 ENCOUNTER — TELEPHONE (OUTPATIENT)
Dept: SURGERY | Facility: CLINIC | Age: 70
End: 2025-02-21

## 2025-02-21 DIAGNOSIS — R26.89 BALANCE DISORDER: ICD-10-CM

## 2025-02-21 DIAGNOSIS — D32.9 MENINGIOMA (HCC): ICD-10-CM

## 2025-02-21 DIAGNOSIS — I67.1 BRAIN ANEURYSM (HCC): ICD-10-CM

## 2025-02-21 PROCEDURE — 70546 MR ANGIOGRAPH HEAD W/O&W/DYE: CPT | Performed by: NEUROLOGICAL SURGERY

## 2025-02-21 PROCEDURE — 70553 MRI BRAIN STEM W/O & W/DYE: CPT | Performed by: NEUROLOGICAL SURGERY

## 2025-02-21 PROCEDURE — A9575 INJ GADOTERATE MEGLUMI 0.1ML: HCPCS | Performed by: NEUROLOGICAL SURGERY

## 2025-02-21 PROCEDURE — 72141 MRI NECK SPINE W/O DYE: CPT | Performed by: NEUROLOGICAL SURGERY

## 2025-02-21 RX ORDER — GADOTERATE MEGLUMINE 376.9 MG/ML
20 INJECTION INTRAVENOUS
Status: COMPLETED | OUTPATIENT
Start: 2025-02-21 | End: 2025-02-21

## 2025-02-21 RX ADMIN — GADOTERATE MEGLUMINE 20 ML: 376.9 INJECTION INTRAVENOUS at 12:17:00

## 2025-02-21 NOTE — TELEPHONE ENCOUNTER
----- Message from Enoch Smith sent at 2/21/2025 12:43 PM CST -----  Please schedule follow up with me for imaging review.

## 2025-02-24 ENCOUNTER — OFFICE VISIT (OUTPATIENT)
Dept: INTERNAL MEDICINE CLINIC | Facility: CLINIC | Age: 70
End: 2025-02-24

## 2025-02-24 VITALS
TEMPERATURE: 98 F | DIASTOLIC BLOOD PRESSURE: 76 MMHG | SYSTOLIC BLOOD PRESSURE: 128 MMHG | HEART RATE: 98 BPM | BODY MASS INDEX: 36.7 KG/M2 | HEIGHT: 64 IN | OXYGEN SATURATION: 98 % | WEIGHT: 215 LBS

## 2025-02-24 DIAGNOSIS — E04.1 THYROID NODULE: ICD-10-CM

## 2025-02-24 DIAGNOSIS — I67.1 BRAIN ANEURYSM (HCC): Primary | ICD-10-CM

## 2025-02-24 DIAGNOSIS — F17.200 SMOKING: ICD-10-CM

## 2025-02-24 DIAGNOSIS — R91.8 LUNG NODULES: ICD-10-CM

## 2025-02-24 DIAGNOSIS — D32.9 MENINGIOMA (HCC): ICD-10-CM

## 2025-02-24 DIAGNOSIS — I71.40 ABDOMINAL AORTIC ANEURYSM (AAA) WITHOUT RUPTURE, UNSPECIFIED PART: ICD-10-CM

## 2025-02-24 PROCEDURE — 99214 OFFICE O/P EST MOD 30 MIN: CPT | Performed by: INTERNAL MEDICINE

## 2025-02-24 PROCEDURE — G2211 COMPLEX E/M VISIT ADD ON: HCPCS | Performed by: INTERNAL MEDICINE

## 2025-02-24 RX ORDER — METOPROLOL SUCCINATE 25 MG/1
25 TABLET, EXTENDED RELEASE ORAL DAILY
Qty: 90 TABLET | Refills: 3 | Status: SHIPPED | OUTPATIENT
Start: 2025-02-24

## 2025-02-24 NOTE — PROGRESS NOTES
Asia Jefferson is a 69 year old female.    HPI:     Chief Complaint   Patient presents with    Follow - Up     1 month        70 y/o F with PMHx smoking, brain aneurysm, TAA, thyroid nodule here for F/U; repeat MRA/ MRI brain on 2/21/25 shows   large right MCA aneurysm measuring 1.2 cm is stable. 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 is stable; seeing NS Dr Smith on 3/10/25 to discuss results; found to have meningioma on MRI brain in Feb 2025 showing meningioma adjacent to the left sylvian fissure measuring 1.2 cm; CTA A/P on 2/8/25 shows 5.6 cm infrarenal abdominal aortic aneurysm is new since 2/16/2013 but otherwise unchanged since 1/22/2025; still smokes  1/2 - 3/4 ppd; she has tried nicotine patches in efforts to quit; recent thyroid ultrasound shows 3 cm left lobe TR 3 nodule.; FNA scheduled on 3/25/25;  no CP; no SOB; no headaches; no palpitation        HISTORY:  Past Medical History:    AAA (abdominal aortic aneurysm)    CT Feb 2013; 2.5 cm AAA    Adrenal adenoma    CT Feb 2013 with 11 mm left adrenal adenoma    Age-related nuclear cataract of both eyes    Alternating esotropia    Anisometropia    Ankle fracture, right    Anxiety state    Back problem    Carpal tunnel syndrome on left    Cataract    Depression    Diverticulitis    Diverticulosis of large intestine    Macular pucker    right eye    Meibomian gland dysfunction (MGD) of both eyes    Osteoarthritis    Osteoarthritis of lumbar spine    PONV (postoperative nausea and vomiting)    Poor dentition    Rotator cuff tear, left    s/p repair ; Ortho Dr Velasquez    Smoking    Visual impairment      Past Surgical History:   Procedure Laterality Date    Arthroscopy of joint unlisted      Colonoscopy  8/11/16    Dr. Duran    Colonoscopy N/A 3/9/2017    Procedure: COLONOSCOPY;  Surgeon: Neptali Duran MD;  [facility] ENDOSCOPY    Colonoscopy      Hip replacement surgery Right 01/2018    Knee arthroscopy      right knee 1970s    Other Left  1998    L rotator cuff repair    Other surgical history  8/17/14    8 dental implants    Total hip replacement Right     Wrist arthroscop,release xvers lig Left 01/28/2020    Left endoscopic carpal tunnel release      Family History   Problem Relation Age of Onset    Cancer Father         colon cancer    Heart Disorder Mother     Diabetes Mother     Cancer Brother         primary unknown    Breast Cancer Maternal Aunt     Ovarian Cancer Neg     DCIS Neg     LCIS Neg     BRCA gene + Neg     Glaucoma Neg     Macular degeneration Neg       Social History:   Social History     Socioeconomic History    Marital status: Single   Tobacco Use    Smoking status: Every Day     Current packs/day: 0.75     Average packs/day: 0.7 packs/day for 47.7 years (35.8 ttl pk-yrs)     Types: Cigarettes     Start date: 6/16/1977     Passive exposure: Current    Smokeless tobacco: Never   Vaping Use    Vaping status: Never Used   Substance and Sexual Activity    Alcohol use: Yes     Alcohol/week: 3.0 standard drinks of alcohol     Types: 3 Standard drinks or equivalent per week     Comment: Occasionally    Drug use: No    Sexual activity: Not Currently     Partners: Female   Other Topics Concern    Caffeine Concern Yes     Comment: Coffee: 3 cups daily    Exercise No   Social History Narrative    The patient does not use an assistive device..      The patient does live in a home with stairs.        Medications (Active prior to today's visit):  Current Outpatient Medications   Medication Sig Dispense Refill    metoprolol succinate ER 25 MG Oral Tablet 24 Hr Take 1 tablet (25 mg total) by mouth daily. 90 tablet 3    ibuprofen 600 MG Oral Tab Take 1 tablet (600 mg total) by mouth every 8 (eight) hours as needed for Pain. 90 tablet 0    acetaminophen (TYLENOL) 325 MG Oral Tab Take 2 tablets (650 mg total) by mouth every 6 (six) hours as needed. 30 tablet 0    psyllium (METAMUCIL SMOOTH TEXTURE) 28 % Oral Powd Pack Take 1 packet by mouth daily.       Multiple Vitamin (ONE-DAILY MULTI VITAMINS) Oral Tab Take 1 tablet by mouth daily.      lidocaine 5 % External Patch Place 1 patch onto the skin daily. (Patient not taking: Reported on 2/24/2025) 14 patch 0       Allergies:  Allergies[1]              ROS:   Constitutional: no weight loss; no fatigue  Cardiovascular:  Negative for chest pain; negative palpitations  Respiratory:  Negative for cough, dyspnea and wheezing  Gastrointestinal:  Negative for abdominal pain, constipation, decreased appetite, diarrhea and vomiting; no melena or hematochezia  All other review of systems are negative.        PHYSICAL EXAM:   Blood pressure 128/76, pulse 98, temperature 98 °F (36.7 °C), height 5' 4\" (1.626 m), weight 215 lb (97.5 kg), SpO2 98%.  Constitutional: alert and oriented x3 in no acute distress  HEENT- EOMI, PERRL  Nose/Mouth/Throat: pharynx without erythema; no oral lesions  Neck/Thyroid: neck supple; no thyromegaly  Cardiovascular: RRR, S1, S2, no S3 or murmur  Respiratory: lungs without crackles or wheezes  Abdomen: normoactive bowel sounds, soft, non-tender and non-distended  Extremities: no clubbing, cyanosis or edema           ASSESSMENT/PLAN:   Brain aneurysm  Seen in ED 1/22/25 for dizziness and neck pain; CTA brain showed 12 mm right MCA bifurcation aneurysm, and a 3. 4 mm pericallosal  anterior cerebral artery aneurysm, and  a fenestrated anterior communicating artery with questionable 3 mm left paramedian aneurysm.  -no headache;   -pt seen by Dr Smith for neurosurgical consultation on 1/27/25  -repeat MRA/ MRI brain on 2/21/25 shows   Large right MCA aneurysm measuring 1.2 cm is stable. 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   -start metoprolol ER 25 mg po every day to prevent BP fluctuations  -discussed smoking cessation   -F/U Dr Smith on 3/10/25     Meningioma  MRI brain in Feb 2025 showed Meningioma adjacent to the left sylvian fissure measuring 1.2 cm   -plan interval surveillance  at Dr Smith's direction    AAA (abdominal aortic aneurysm)  Pt had CT Abd in Feb 2013 which showed 2.5 cm AAA; U/S aorta in July 2014 showed ectasia mild dilatation aneurysmal distal abdominal aorta measures 3.2 x 3.1 cm; pt defers repeat testing for now  -repeat CT abdomen 1/22/25 shows 5.5 cm infrarenal abdominal aortic aneurysm.    -saw PV Dr Najjar on 1/30/25  -CTA A/P on 2/8/25 shows 5.6 cm infrarenal abdominal aortic aneurysm is new since 2/16/2013 but otherwise unchanged since 1/22/2025.   -start metoprolol ER 25 mg po every day to prevent BP fluctuations  -discussed smoking cessation  -F/U Dr Najjar on 2/27/25      Cervicalgia  On Tylenol prn, or Motrin 600 mg po [id number] prn; dicussed XR C-spine, though pt declines for now     Right shoulder pain  Likely DJD vs rotator cuff arthropathy;  XR imaging ordered; pt given referral to orthopedics , Dr Jovi Menard, though not pursued     Health Maintenance  last PAP was around 2001; discussed PAP--> she will consider; Prevnar 13 given 12/18/19; Pneumovax given Nov 2020; had flu vax Oct 2024     Urticaria  Had Allergic reaction with Hives and lip swelling in May 2024   No reoccurrence  Has epipen  -saw allergist Dr Tracy      Carpal tunnel syndrome  S/p left carpal release by Dr Mello in Jan 2020;     History of Right hip replacement  s/p right hip THR under care of orthopedics Dr Nguyen on 1/15/18 at [facility]; using cane to ambulate;  No longer taking ibuprofen 200 mg po qD     Gait abnormality  -c/o balance problems; advised for physical therapy     Smoking  Denies dyspnea; advised cessation; now 1/2 - 3/4 ppd; UA in Feb 2017 neg hematuria; does not wish to take bupropion, or Chantix; check UA  -strongly encouraged cessation; she has tried nicotine patches     Osteoarthritis of lumbar spine  XR lumbar spine in 2013 shows moderate multilevel DJD; pain responds to Advil prn; repeat XR lumbar spine in May 2017 shows moderate multilevel DJD      Diverticulosis  Seen on colonoscopy in Aug 2016     Colon polyps  S/p colonoscopy in Aug 2016 with six polyps found;  repeat colonoscopy in March 2017 with three polyps removed; next colonoscopy in 3 years (or March 2020) per Dr Neptali Duran     Poor dentition  Now has full dentures on top and bottom with dental implants     Screening mammogram  Mammogram in Jan 2019 was normal; mammogram ordered     Hypercholesterolemia   in May 2022     Vitamin D deficiency  Level 22 in May 2022; advise Vitamin D 1000 IU po qD     Right eye cataract  Saw optometrist in [place]     Depression  mild and episodic; declines anti-depressant     Thyroid nodule  CT chest showed 2.7 cm left thyroid nodule.   -thyroid ultrasound shows 3 cm left lobe TR 3 nodule.    -FNA scheduled on 3/25/25     Lung nodules  CT chest in Jan 2025 shows 3 left upper lobe nodules measuring each about 6 mm   -repeat CT chest in 6 months, or July 2025        RTC 3 months  Cell [phone]      Spent 30 minutes obtaining history, evaluating patient, discussing treatment options, diet, exercise, review of available labs and radiology reports, and completing documentation.                Orders This Visit:  No orders of the defined types were placed in this encounter.      Meds This Visit:  Requested Prescriptions     Signed Prescriptions Disp Refills    metoprolol succinate ER 25 MG Oral Tablet 24 Hr 90 tablet 3     Sig: Take 1 tablet (25 mg total) by mouth daily.       Imaging & Referrals:  None     2/24/2025  Isael Boogie MD                 [1]   Allergies  Allergen Reactions    Bactrim [Sulfamethoxazole W/Trimethoprim] RASH

## 2025-02-24 NOTE — TELEPHONE ENCOUNTER
Future Appointments   Date Time Provider Department [place]   2/24/2025  3:00 PM [facility]   2/27/2025  2:45 PM Najjar, Samer F, MD [facility]   3/10/2025  4:30 PM Enoch Smith MD EMG NEURSURG [facility]   3/25/2025  2:00 PM [facility] RM[facility]     Patient scheduled

## 2025-02-27 ENCOUNTER — OFFICE VISIT (OUTPATIENT)
Facility: CLINIC | Age: 70
End: 2025-02-27

## 2025-02-27 VITALS
SYSTOLIC BLOOD PRESSURE: 118 MMHG | HEIGHT: 64 IN | BODY MASS INDEX: 36.7 KG/M2 | DIASTOLIC BLOOD PRESSURE: 68 MMHG | WEIGHT: 215 LBS

## 2025-02-27 DIAGNOSIS — I71.42 JUXTARENAL ABDOMINAL AORTIC ANEURYSM (AAA) WITHOUT RUPTURE: Primary | ICD-10-CM

## 2025-02-27 PROCEDURE — 99214 OFFICE O/P EST MOD 30 MIN: CPT | Performed by: SURGERY

## 2025-02-28 NOTE — PROGRESS NOTES
Samer F. Najjar, MD  Vascular Surgery  [facility]       VASCULAR SURGERY OFFICE NOTE        Name: Asia Jefferson   : 1955  [id number]     REASON FOR VISIT:   Patient is a 69 year old female who is here to discuss the findings of her recent CT angiogram of the abdomen pelvis that revealed that the size of the aneurysm has reached 6.4 cm.  There is a large left renal accessory vein emanating immediately proximal to the aneurysm.  This appears to be supplying the left lower pole area of her kidney which is also being supplied by her main left renal artery.  The patient is scheduled to undergo a thyroid biopsy as well as reevaluation by her neurosurgeon for a cerebral aneurysm.  She feels that there are too many issues happening at the same time.    PAST MEDICAL HISTORY:     Past Medical History:    AAA (abdominal aortic aneurysm)    CT 2013; 2.5 cm AAA    Adrenal adenoma    CT 2013 with 11 mm left adrenal adenoma    Age-related nuclear cataract of both eyes    Alternating esotropia    Anisometropia    Ankle fracture, right    Anxiety state    Back problem    Carpal tunnel syndrome on left    Cataract    Depression    Diverticulitis    Diverticulosis of large intestine    Macular pucker    right eye    Meibomian gland dysfunction (MGD) of both eyes    Osteoarthritis    Osteoarthritis of lumbar spine    PONV (postoperative nausea and vomiting)    Poor dentition    Rotator cuff tear, left    s/p repair ; Ortho Dr Velasquez    Smoking    Visual impairment       PAST SURGICAL HISTORY:     Past Surgical History:   Procedure Laterality Date    Arthroscopy of joint unlisted      Colonoscopy  16    Dr. Duran    Colonoscopy N/A 3/9/2017    Procedure: COLONOSCOPY;  Surgeon: Neptali Duran MD;  Location: [facility] ENDOSCOPY    Colonoscopy      Hip replacement surgery Right 2018    Knee arthroscopy      right knee 1970s    Other Left     L rotator cuff repair    Other surgical  history  8/17/14    8 dental implants    Total hip replacement Right     Wrist arthroscop,release xvers lig Left 01/28/2020    Left endoscopic carpal tunnel release        MEDICATIONS:     Current Outpatient Medications:     metoprolol succinate ER 25 MG Oral Tablet 24 Hr, Take 1 tablet (25 mg total) by mouth daily., Disp: 90 tablet, Rfl: 3    ibuprofen 600 MG Oral Tab, Take 1 tablet (600 mg total) by mouth every 8 (eight) hours as needed for Pain., Disp: 90 tablet, Rfl: 0    lidocaine 5 % External Patch, Place 1 patch onto the skin daily. (Patient not taking: Reported on 2/24/2025), Disp: 14 patch, Rfl: 0    acetaminophen (TYLENOL) 325 MG Oral Tab, Take 2 tablets (650 mg total) by mouth every 6 (six) hours as needed., Disp: 30 tablet, Rfl: 0    psyllium (METAMUCIL SMOOTH TEXTURE) 28 % Oral Powd Pack, Take 1 packet by mouth daily., Disp: , Rfl:     Multiple Vitamin (ONE-DAILY MULTI VITAMINS) Oral Tab, Take 1 tablet by mouth daily., Disp: , Rfl:     LABSS:     No results found for: \"EAG\", \"A1C\"   Lab Results   Component Value Date     (H) 01/22/2025    BUN 8 (L) 01/22/2025    CREATSERUM 0.66 01/22/2025    BUNCREA 12.1 01/22/2025    ANIONGAP 9 01/22/2025    GFRAA 105 05/06/2022    GFRNAA 92 05/06/2022    CA 10.1 01/22/2025     01/22/2025    K 3.9 01/22/2025     01/22/2025    CO2 23.0 01/22/2025    OSMOCALC 282 01/22/2025      Lab Results   Component Value Date    WBC 9.0 01/22/2025    RBC 4.41 01/22/2025    HGB 13.9 01/22/2025    HCT 41.4 01/22/2025    MCV 93.9 01/22/2025    MCH 31.5 01/22/2025    MCHC 33.6 01/22/2025    RDW 12.8 01/22/2025    .0 01/22/2025    MPV 8.7 01/17/2019        Lab Results   Component Value Date    HGB 13.9 01/22/2025    HGB 14.3 12/30/2024    HGB 15.1 05/26/2024    HGB 14.3 05/06/2022    HGB 13.1 01/17/2020    HGB 14.5 01/17/2019    CREATSERUM 0.66 01/22/2025    CREATSERUM 0.67 12/30/2024    CREATSERUM 0.59 05/26/2024    CREATSERUM 0.65 05/06/2022    CREATSERUM 0.60  01/17/2020    CREATSERUM 0.71 01/17/2019       EXAM:   /68 (BP Location: Radial, Cuff Size: adult)   Ht 5' 4\" (1.626 m)   Wt 215 lb (97.5 kg)   BMI 36.90 kg/m²     Not examined today     ASSESSMENT    Diagnoses and all orders for this visit:    Juxtarenal abdominal aortic aneurysm (AAA) without rupture    I had a prolonged discussion with the patient where I gave her different options for management of her aneurysm.  She essentially has 3 options if she wants to manage the aneurysm.  The first option is to have her undergo a diagnostic angiogram by my colleagues from interventional radiology whereby both left renal and left accessory renal arteries are interrogated in order to see what part of the kidney lites up.  If both appear to be supplying the same region was seen on her CT angiogram, that I would recommend embolization of the accessory renal artery followed later by a conventional endovascular abdominal aortic aneurysm repair.  If it appears that the left accessory renal artery embolization will cause significant ischemia to the left kidney, and then she will benefit from a fenestrated repair which would be her second option.  Lastly, the third option would be to perform an open repair.  Fortunately the patient has a long neck which allows for the different options to be considered.  I favor the first option.  The patient wanted to get back to me after she has done with evaluation of her thyroid and her scheduled neurosurgery visit which she tells me should be completed the third week of March.  She fully understands that there is always a risk of rupture between now and then but I think it is reasonable approach given her anxiety conditions.  She voiced a clear understanding of the risk of waiting.      PLAN:     Patient to contact me in about 3 weeks so that we can formulate a plan.        Samer F. Najjar MD  Division of Vascular Surgery

## 2025-03-10 ENCOUNTER — OFFICE VISIT (OUTPATIENT)
Dept: SURGERY | Facility: CLINIC | Age: 70
End: 2025-03-10
Payer: MEDICARE

## 2025-03-10 VITALS
HEART RATE: 100 BPM | HEIGHT: 64 IN | OXYGEN SATURATION: 96 % | BODY MASS INDEX: 36.7 KG/M2 | SYSTOLIC BLOOD PRESSURE: 135 MMHG | DIASTOLIC BLOOD PRESSURE: 74 MMHG | WEIGHT: 215 LBS

## 2025-03-10 DIAGNOSIS — F17.200 SMOKING: ICD-10-CM

## 2025-03-10 DIAGNOSIS — D32.9 MENINGIOMA (HCC): ICD-10-CM

## 2025-03-10 DIAGNOSIS — R26.89 BALANCE DISORDER: ICD-10-CM

## 2025-03-10 DIAGNOSIS — I67.1 BRAIN ANEURYSM (HCC): Primary | ICD-10-CM

## 2025-03-10 PROCEDURE — 99215 OFFICE O/P EST HI 40 MIN: CPT | Performed by: NEUROLOGICAL SURGERY

## 2025-03-10 PROCEDURE — 99406 BEHAV CHNG SMOKING 3-10 MIN: CPT | Performed by: NEUROLOGICAL SURGERY

## 2025-03-10 NOTE — PROGRESS NOTES
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  She presented to the emergency department on 1/22/2025 with worsening of balance, dizziness, and right shoulder/neck pain and stiffness.  CTA head and neck 1/22/2025 demonstrates a 11 mm right middle cerebral artery bifurcation aneurysm and a 4 mm distal anterior cerebral artery aneurysm.  Limited 3 sequence MRI brain 1/22/2025 demonstrates possible 1 cm left greater sphenoid wing meningioma and no evidence of infarct.  CT chest abdomen pelvis demonstrates some small pulmonary nodules and an enlarged abdominal aortic aneurysm.  She reports that she was told that she will have serial imaging for the pulmonary nodules and she has a scheduled appointment with vascular surgery for her enlarging abdominal aortic aneurysm.  She reports active smoking.  She denies family history of aneurysm or subarachnoid hemorrhage.    Interval history 3/10/2025  MRI brain 2/21/2025 demonstrates a 1.2 cm left lateral sphenoid wing meningioma without underlying brain edema.  MRA brain 2/21/2025 demonstrates stability of the right MCA aneurysm without vessel wall enhancement.  MRI cervical 2/21/2025 demonstrates multilevel spondylosis with moderate canal stenosis most prominent at C3-4 and C6-7.  She reports ongoing stable balance difficulty and right sided neck pain.  She recently was seen by Dr. Najjar of vascular  surgery who noted growth of her abdominal aortic aneurysm and is recommending treatment.  She is scheduled for a thyroid biopsy.    PAST MEDICAL HISTORY:  Past Medical History:    AAA (abdominal aortic aneurysm)    CT Feb 2013; 2.5 cm AAA    Adrenal adenoma    CT Feb 2013 with 11 mm left adrenal adenoma    Age-related nuclear cataract of both eyes    Alternating esotropia    Anisometropia    Ankle fracture, right    Anxiety state    Back problem    Carpal tunnel syndrome on left    Cataract    Depression    Diverticulitis    Diverticulosis of large intestine    Macular pucker    right eye    Meibomian gland dysfunction (MGD) of both eyes    Osteoarthritis    Osteoarthritis of lumbar spine    PONV (postoperative nausea and vomiting)    Poor dentition    Rotator cuff tear, left    s/p repair ; Ortho Dr Velasquez    Smoking    Visual impairment       PAST SURGICAL HISTORY:  Past Surgical History:   Procedure Laterality Date    Arthroscopy of joint unlisted      Colonoscopy  8/11/16    Dr. Duran    Colonoscopy N/A 3/9/2017    Procedure: COLONOSCOPY;  Surgeon: Neptali Duran MD;  Location: [facility] ENDOSCOPY    Colonoscopy      Hip replacement surgery Right 01/2018    Knee arthroscopy      right knee 1970s    Other Left 1998    L rotator cuff repair    Other surgical history  8/17/14    8 dental implants    Total hip replacement Right     Wrist arthroscop,release xvers lig Left 01/28/2020    Left endoscopic carpal tunnel release       FAMILY HISTORY:  family history includes Breast Cancer in her maternal aunt; Cancer in her brother and father; Diabetes in her mother; Heart Disorder in her mother.    SOCIAL HISTORY:   reports that she has been smoking cigarettes. She started smoking about 47 years ago. She has a 35.8 pack-year smoking history. She has been exposed to tobacco smoke. She has never used smokeless tobacco. She reports current alcohol use of about 3.0 standard drinks of alcohol per week. She reports that she does  not use drugs.    ALLERGIES:  Allergies[1]    MEDICATIONS:  Medications Ordered Prior to Encounter[2]    REVIEW OF SYSTEMS:  A 10-point system was reviewed.  Pertinent positives and negatives are noted in HPI.      PHYSICAL EXAMINATION:  VITAL SIGNS: /74 (BP Location: Left arm, Patient Position: Sitting, Cuff Size: large)   Pulse 100   Ht 64\"   Wt 215 lb (97.5 kg)   SpO2 96%   BMI 36.90 kg/m²     A&Ox3, no acute distress  PERRL, EOMi, FS, TM  Full strength x 4, no drift  Sensation intact   No hyperreflexia, no Leo's, no clonus  Stable casual unassisted gait    ASSESSMENT:  69-year-old female with incidental stable unruptured 11mm right MCA and 4mm distal VIDYA aneurysms, an incidental 1.2cm left lateral sphenoid wing meningioma, and balance difficulty possibly attributable to cervical spondylotic myelopathy    I had an extensive discussion with the patient regarding the current clinical situation and plan of care.  We reviewed the imaging together.  We had an extensive discussion about the natural history of intracranial aneurysms.  We discussed the risks, benefits, alternatives, goals, and expectations of a diagnostic cerebral angiogram for further characterization of the aneurysm as well as all of the management options including observation as well as aneurysm treatment with endovascular or open surgical techniques.  We discussed the signs and symptoms for which to seek emergent medical attention.  We discussed the modifiable risk factors, including the most important one being smoking cessation which we discussed at length.      We also discussed that we will manage her meningioma with serial imaging.    I explained that she should proceed with treatment of her abdominal aortic aneurysm as recommended by Dr. Najjar.  Given the stability of her neck pain and balance difficulty, we will plan to reevaluate her cervical pathology thereafter and we will consider further workup of her aneurysms  thereafter as well.    Plan:  - We will reach out in May to determine neck steps of care for cervical stenosis and her brain aneurysms  - Counseled extensively on smoking cessation, and should reach out to her primary care physician for further assistive measures  - Follow-up with vascular surgery for management of abdominal aortic aneurysm  - Follow-up with primary care physician for management of pulmonary nodules seen on imaging and for hypertension management    Enoch Smith MD  Neurological Surgery  [facility] Time: 45 min including face to face time, chart review, imaging interpretation, and coordination of care, and a greater than 5 minute discussion of smoking cessation         [1]   Allergies  Allergen Reactions    Bactrim [Sulfamethoxazole W/Trimethoprim] RASH   [2]   Current Outpatient Medications on File Prior to Visit   Medication Sig Dispense Refill    metoprolol succinate ER 25 MG Oral Tablet 24 Hr Take 1 tablet (25 mg total) by mouth daily. 90 tablet 3    psyllium (METAMUCIL SMOOTH TEXTURE) 28 % Oral Powd Pack Take 1 packet by mouth daily.      Multiple Vitamin (ONE-DAILY MULTI VITAMINS) Oral Tab Take 1 tablet by mouth daily.      ibuprofen 600 MG Oral Tab Take 1 tablet (600 mg total) by mouth every 8 (eight) hours as needed for Pain. (Patient not taking: Reported on 3/10/2025) 90 tablet 0    lidocaine 5 % External Patch Place 1 patch onto the skin daily. (Patient not taking: Reported on 3/10/2025) 14 patch 0    acetaminophen (TYLENOL) 325 MG Oral Tab Take 2 tablets (650 mg total) by mouth every 6 (six) hours as needed. (Patient not taking: Reported on 3/10/2025) 30 tablet 0     No current facility-administered medications on file prior to visit.

## 2025-03-21 NOTE — DISCHARGE INSTRUCTIONS
Discharge Instructions for Fine-Needle Thyroid Biopsy  You have had a procedure called fine-needle thyroid biopsy. This biopsy was done to learn more about a nodule or cyst in your thyroid gland or to find out what might be causing enlargement of your thyroid. During the biopsy, a very thin needle is inserted through the skin into the gland. The needle is used to remove a small amount of tissue from the gland. More than 1 tissue sample may be done to be sure to get cells from all parts of the nodule. Or the needle might be used to drain fluid from a cyst. Often a special ultrasound probe or transducer is used to help guide the needle to the right place. The tissue or fluid is then studied in a lab.    Home care  Here are some tips to take care of yourself at home:   You will have a small adhesive bandage on your biopsy site. Leave the bandage on for 4 to 6 hours. After this time, you don't need to keep it covered.   If you feel discomfort after the biopsy, take over-the-counter pain medicine. Don't take aspirin. It's normal to feel sore for a day or 2.  Ask your healthcare provider when you can return to normal activities. This will likely be the same day as your procedure.  Getting your results  Your biopsy results may take a few days. When the results are ready, your healthcare provider will discuss them with you and tell you what, if anything, needs to be done next. If you have questions, consider writing them down so you won't forget to ask when the provider calls.   Follow-up  Make a follow-up appointment as directed by your healthcare provider.  When to call your healthcare provider  Call your healthcare provider right away if you have any of the following:  Bleeding that won’t stop  Shortness of breath, trouble breathing or speaking, or a change in your voice  Fever of 100.4°F (38°C) or higher, or as directed by your provider  Increasing pain, redness, tenderness, or drainage at the biopsy site  Swelling of  the biopsy site  Be sure you understand what problems you should watch for and know how to reach the healthcare provider after hours and on weekends.       THANK YOU, WE WISH YOU WELL

## 2025-03-25 ENCOUNTER — HOSPITAL ENCOUNTER (OUTPATIENT)
Dept: ULTRASOUND IMAGING | Facility: HOSPITAL | Age: 70
Discharge: HOME | End: 2025-03-25
Attending: INTERNAL MEDICINE
Payer: MEDICARE

## 2025-03-25 DIAGNOSIS — E04.1 THYROID NODULE: ICD-10-CM

## 2025-03-25 PROCEDURE — 88173 CYTOPATH EVAL FNA REPORT: CPT | Performed by: INTERNAL MEDICINE

## 2025-03-25 PROCEDURE — 88172 CYTP DX EVAL FNA 1ST EA SITE: CPT | Performed by: INTERNAL MEDICINE

## 2025-03-25 PROCEDURE — 10005 FNA BX W/US GDN 1ST LES: CPT | Performed by: INTERNAL MEDICINE

## 2025-03-25 PROCEDURE — 88177 CYTP FNA EVAL EA ADDL: CPT | Performed by: INTERNAL MEDICINE

## 2025-03-25 NOTE — IMAGING NOTE
1340 Pt arrived to ultrasound room #2    History taken and as follows:    Impression   CONCLUSION:  3 cm left lobe TR 3 nodule.  Advise fine-needle aspiration.      .    1343 Procedure explained questions answered.    1343 Consent verified and obtained  /61    [facility]     1413  scans reviewed by Dr. MOROCHO    Site marked LEFT     1416 Time out taken      1417 Area cleaned sterile towels  to site. Pathology  was  notified.      1419 Lidocaine 1% 10 milligrams per ml  from kit  was given 3ml     FNA # 1 taken at  1419 with 25 g needle    FNA # 2 taken at 1420  with 25 g needle    1424 Procedure completed area re scanned . Area cleaned band aid to site ice pack to site.        1428 Post instructions given  verbal et written with AVS summary sheet provided to patient.    1435  Pt  discharged .

## 2025-05-22 ENCOUNTER — OFFICE VISIT (OUTPATIENT)
Dept: INTERNAL MEDICINE CLINIC | Facility: CLINIC | Age: 70
End: 2025-05-22

## 2025-05-22 VITALS
DIASTOLIC BLOOD PRESSURE: 64 MMHG | WEIGHT: 212 LBS | HEIGHT: 64 IN | TEMPERATURE: 98 F | SYSTOLIC BLOOD PRESSURE: 128 MMHG | OXYGEN SATURATION: 96 % | HEART RATE: 96 BPM | BODY MASS INDEX: 36.19 KG/M2

## 2025-05-22 DIAGNOSIS — I67.1 BRAIN ANEURYSM (HCC): Primary | ICD-10-CM

## 2025-05-22 DIAGNOSIS — I71.40 ABDOMINAL AORTIC ANEURYSM (AAA) WITHOUT RUPTURE, UNSPECIFIED PART: ICD-10-CM

## 2025-05-22 DIAGNOSIS — E04.1 THYROID NODULE: ICD-10-CM

## 2025-05-22 DIAGNOSIS — Z87.891 SMOKING HISTORY: ICD-10-CM

## 2025-05-22 DIAGNOSIS — R91.8 LUNG NODULES: ICD-10-CM

## 2025-05-22 DIAGNOSIS — M47.812 OSTEOARTHRITIS OF CERVICAL SPINE, UNSPECIFIED SPINAL OSTEOARTHRITIS COMPLICATION STATUS: ICD-10-CM

## 2025-05-22 DIAGNOSIS — D32.9 MENINGIOMA (HCC): ICD-10-CM

## 2025-05-22 PROCEDURE — G2211 COMPLEX E/M VISIT ADD ON: HCPCS | Performed by: INTERNAL MEDICINE

## 2025-05-22 PROCEDURE — 99214 OFFICE O/P EST MOD 30 MIN: CPT | Performed by: INTERNAL MEDICINE

## 2025-05-22 RX ORDER — VARENICLINE TARTRATE 1 MG/1
1 TABLET, FILM COATED ORAL 2 TIMES DAILY
Qty: 60 TABLET | Refills: 3 | Status: SHIPPED | OUTPATIENT
Start: 2025-05-22

## 2025-05-22 RX ORDER — VARENICLINE TARTRATE 0.5 (11)-1
1 KIT ORAL DAILY
Qty: 53 EACH | Refills: 0 | Status: SHIPPED | OUTPATIENT
Start: 2025-05-22

## 2025-05-22 NOTE — PROGRESS NOTES
Asia Jefferson is a 69 year old female.    HPI:     Chief Complaint   Patient presents with    Checkup     3 month       70 y/o F with brain aneurysm, AAA, smoking here for F/U; saw Dr Smith  for brain aneurysm on 3/10/25; surveillance as directed by Dr Smith with F/U planned in May 2025; CTA A/P on 2/8/25 shows 5.6 cm infrarenal abdominal aortic aneurysm is new since 2/16/2013 but otherwise unchanged since 1/22/2025; on metoprolol ER 25 mg po every day to prevent BP fluctuations; saw Dr Najjar on 2/27/25 and surgical treatmentt were discussed, though pt not yet decided on treatment options; she is still smoking 1/2 - 3/4 ppd; FNA thyroid nodule on 3/25/25 showed benign follicular nodule; no malignant cells; CT chest in Jan 2025 shows 3 left upper lobe nodules measuring each about 6 mm; awaits repeat CT chest in 6 months, or July 2025;  no CP; no SOB; no headaches; no palpitation              HISTORY:  Past Medical History[1]   Past Surgical History[2]   Family History[3]   Social History: Short Social Hx on File[4]     Medications (Active prior to today's visit):  Current Medications[5]    Allergies:  Allergies[6]              ROS:   Constitutional: no weight loss; no fatigue  Cardiovascular:  Negative for chest pain; negative palpitations  Respiratory:  Negative for cough, dyspnea and wheezing  Gastrointestinal:  Negative for abdominal pain, constipation, decreased appetite, diarrhea and vomiting; no melena or hematochezia  All other review of systems are negative.        PHYSICAL EXAM:   Blood pressure 128/64, pulse 96, temperature 97.9 °F (36.6 °C), height 5' 4\" (1.626 m), weight 212 lb (96.2 kg), SpO2 96%.  Constitutional: alert and oriented x3 in no acute distress  HEENT- EOMI, PERRL  Nose/Mouth/Throat: pharynx without erythema; no oral lesions  Neck/Thyroid: neck supple; no thyromegaly  Cardiovascular: RRR, S1, S2, no S3 or murmur  Respiratory: lungs without crackles or wheezes  Abdomen:  normoactive bowel sounds, soft, non-tender and non-distended  Extremities: no clubbing, cyanosis or edema           ASSESSMENT/PLAN:   Brain aneurysm  Seen in ED 1/22/25 for dizziness and neck pain; CTA brain showed 12 mm right MCA bifurcation aneurysm, and a 3. 4 mm pericallosal  anterior cerebral artery aneurysm, and  a fenestrated anterior communicating artery with questionable 3 mm left paramedian aneurysm.  -no headache;   -pt seen by Dr Smith for neurosurgical consultation on 1/27/25  -repeat MRA/ MRI brain on 2/21/25 shows   Large right MCA aneurysm measuring 1.2 cm is stable. 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   -start metoprolol ER 25 mg po every day to prevent BP fluctuations  -discussed smoking cessation   -saw Dr Smith on 3/10/25; Rx as directed by Dr Smith    Cervical osteoarthritis  MRI cervical spine in Feb 2025 shows moderate osteoarthritic changes throughout the cervical spine.     Meningioma  MRI brain in Feb 2025 showed Meningioma adjacent to the left sylvian fissure measuring 1.2 cm   -plan interval surveillance at Dr Smith's direction     AAA (abdominal aortic aneurysm)  Pt had CT Abd in Feb 2013 which showed 2.5 cm AAA; U/S aorta in July 2014 showed ectasia mild dilatation aneurysmal distal abdominal aorta measures 3.2 x 3.1 cm; pt defers repeat testing for now  -repeat CT abdomen 1/22/25 shows 5.5 cm infrarenal abdominal aortic aneurysm.    -saw [facility] Dr Najjar on 1/30/25  -CTA A/P on 2/8/25 shows 5.6 cm infrarenal abdominal aortic aneurysm is new since 2/16/2013 but otherwise unchanged since 1/22/2025.   -on metoprolol ER 25 mg po every day to prevent BP fluctuations  -discussed smoking cessation  -F/U Dr Najjar on 2/27/25      Right shoulder pain  Likely DJD vs rotator cuff arthropathy;  XR imaging ordered; pt given referral to orthopedics , Dr Jovi Menadr, though not pursued     Health Maintenance  last PAP was around 2001; discussed PAP--> she will consider;  Prevnar 13 given 12/18/19; Pneumovax given Nov 2020; had flu vax Oct 2024     Urticaria  Had Allergic reaction with Hives and lip swelling in May 2024   No reoccurrence  Has epipen  -saw allergist Dr Tracy      Carpal tunnel syndrome  S/p left carpal release by Dr Mello in Jan 2020;     History of Right hip replacement  s/p right hip THR under care of orthopedics Dr Nguyen on 1/15/18 at [facility]; using cane to ambulate;  No longer taking ibuprofen 200 mg po qD     Gait abnormality  -c/o balance problems; advised for physical therapy     Smoking  Denies dyspnea; advised cessation; now 1/2 - 3/4 ppd; UA in Feb 2017 neg hematuria; refuses to take bupropion, or Chantix; check UA  -strongly encouraged cessation; she has tried nicotine patches  -Chantix 1 mg po every day starter heber , then 1 mg po BID     Osteoarthritis of lumbar spine  XR lumbar spine in 2013 shows moderate multilevel DJD; pain responds to Advil prn; repeat XR lumbar spine in May 2017 shows moderate multilevel DJD     Diverticulosis  Seen on colonoscopy in Aug 2016     Colon polyps  S/p colonoscopy in Aug 2016 with six polyps found;  repeat colonoscopy in March 2017 with three polyps removed; next colonoscopy in 3 years (or March 2020) per Dr Neptali Duran     Poor dentition  Now has full dentures on top and bottom with dental implants     Screening mammogram  Mammogram in Jan 2019 was normal; mammogram ordered     Hypercholesterolemia   in May 2022     Vitamin D deficiency  Level 22 in May 2022; advise Vitamin D 1000 IU po qD     Right eye cataract  Saw optometrist in [place]     Depression  mild and episodic; declines anti-depressant     Thyroid nodule  CT chest showed 2.7 cm left thyroid nodule.   -thyroid ultrasound shows 3 cm left lobe TR 3 nodule.    -FNA on 3/25/25 showed benign follicular nodule; no malignant cells     Lung nodules  CT chest in Jan 2025 shows 3 left upper lobe nodules measuring each about 6 mm   -repeat  CT chest in 6 months, or July 2025        RTC 3 months  Cell [phone]      Spent 30 minutes obtaining history, evaluating patient, discussing treatment options, diet, exercise, review of available labs and radiology reports, and completing documentation.                Orders This Visit:  No orders of the defined types were placed in this encounter.      Meds This Visit:  Requested Prescriptions     Signed Prescriptions Disp Refills    Varenicline Tartrate, Starter, (CHANTIX STARTING MONTH PAK) 0.5 MG X 11 & 1 MG X 42 Oral Tablet Therapy Pack 53 each 0     Sig: Take 1 tablet by mouth daily. As instructed on pak    varenicline (CHANTIX CONTINUING MONTH PAK) 1 MG Oral Tab 60 tablet 3     Sig: Take 1 tablet (1 mg total) by mouth 2 (two) times daily.       Imaging & Referrals:  CT CHEST (CPT=71250)     5/22/2025  Isael Boogie MD                 [1]   Past Medical History:   AAA (abdominal aortic aneurysm)    CT Feb 2013; 2.5 cm AAA    Adrenal adenoma    CT Feb 2013 with 11 mm left adrenal adenoma    Age-related nuclear cataract of both eyes    Alternating esotropia    Anisometropia    Ankle fracture, right    Anxiety state    Back problem    Carpal tunnel syndrome on left    Cataract    Depression    Diverticulitis    Diverticulosis of large intestine    Macular pucker    right eye    Meibomian gland dysfunction (MGD) of both eyes    Osteoarthritis    Osteoarthritis of lumbar spine    PONV (postoperative nausea and vomiting)    Poor dentition    Rotator cuff tear, left    s/p repair ; Ortho Dr Velasquez    Smoking    Visual impairment   [2]   Past Surgical History:  Procedure Laterality Date    Arthroscopy of joint unlisted      Colonoscopy  8/11/16    Dr. Duran    Colonoscopy N/A 3/9/2017    Procedure: COLONOSCOPY;  Surgeon: Neptali Duran MD;  Location: [facility] ENDOSCOPY    Colonoscopy      Hip replacement surgery Right 01/2018    Knee arthroscopy      right knee 1970s    Other Left 1998    L rotator cuff  repair    Other surgical history  8/17/14    8 dental implants    Total hip replacement Right     Wrist arthroscop,release xvers lig Left 01/28/2020    Left endoscopic carpal tunnel release   [3]   Family History  Problem Relation Age of Onset    Cancer Father         colon cancer    Heart Disorder Mother     Diabetes Mother     Cancer Brother         primary unknown    Breast Cancer Maternal Aunt     Ovarian Cancer Neg     DCIS Neg     LCIS Neg     BRCA gene + Neg     Glaucoma Neg     Macular degeneration Neg    [4]   Social History  Socioeconomic History    Marital status: Single   Tobacco Use    Smoking status: Every Day     Current packs/day: 0.75     Average packs/day: 0.8 packs/day for 47.9 years (36.0 ttl pk-yrs)     Types: Cigarettes     Start date: 6/16/1977     Passive exposure: Current    Smokeless tobacco: Never   Vaping Use    Vaping status: Never Used   Substance and Sexual Activity    Alcohol use: Yes     Alcohol/week: 3.0 standard drinks of alcohol     Types: 3 Standard drinks or equivalent per week     Comment: Occasionally    Drug use: No    Sexual activity: Not Currently     Partners: Female   Other Topics Concern    Caffeine Concern Yes     Comment: Coffee: 3 cups daily    Exercise No   Social History Narrative    The patient does not use an assistive device..      The patient does live in a home with stairs.   [5]   Current Outpatient Medications   Medication Sig Dispense Refill    Varenicline Tartrate, Starter, (CHANTIX STARTING MONTH PAK) 0.5 MG X 11 & 1 MG X 42 Oral Tablet Therapy Pack Take 1 tablet by mouth daily. As instructed on pak 53 each 0    varenicline (CHANTIX CONTINUING MONTH PAK) 1 MG Oral Tab Take 1 tablet (1 mg total) by mouth 2 (two) times daily. 60 tablet 3    metoprolol succinate ER 25 MG Oral Tablet 24 Hr Take 1 tablet (25 mg total) by mouth daily. 90 tablet 3    psyllium (METAMUCIL SMOOTH TEXTURE) 28 % Oral Powd Pack Take 1 packet by mouth daily.      Multiple Vitamin  (ONE-DAILY MULTI VITAMINS) Oral Tab Take 1 tablet by mouth daily.      ibuprofen 600 MG Oral Tab Take 1 tablet (600 mg total) by mouth every 8 (eight) hours as needed for Pain. (Patient not taking: Reported on 3/10/2025) 90 tablet 0    lidocaine 5 % External Patch Place 1 patch onto the skin daily. (Patient not taking: Reported on 3/10/2025) 14 patch 0    acetaminophen (TYLENOL) 325 MG Oral Tab Take 2 tablets (650 mg total) by mouth every 6 (six) hours as needed. (Patient not taking: Reported on 3/10/2025) 30 tablet 0   [6]   Allergies  Allergen Reactions    Bactrim [Sulfamethoxazole W/Trimethoprim] RASH

## 2025-06-12 ENCOUNTER — HOSPITAL ENCOUNTER (OUTPATIENT)
Dept: CT IMAGING | Facility: HOSPITAL | Age: 70
Discharge: HOME | End: 2025-06-12
Attending: INTERNAL MEDICINE
Payer: MEDICARE

## 2025-06-12 DIAGNOSIS — R91.8 LUNG NODULES: ICD-10-CM

## 2025-06-12 PROCEDURE — 71250 CT THORAX DX C-: CPT | Performed by: INTERNAL MEDICINE

## 2025-06-14 ENCOUNTER — TELEPHONE (OUTPATIENT)
Dept: INTERNAL MEDICINE CLINIC | Facility: CLINIC | Age: 70
End: 2025-06-14

## 2025-06-14 DIAGNOSIS — N63.0 PERSISTENT NODULARITY OF BREAST: ICD-10-CM

## 2025-06-14 DIAGNOSIS — R92.8 ABNORMAL MAMMOGRAM: ICD-10-CM

## 2025-06-14 DIAGNOSIS — R93.89 ABNORMAL CT OF THE CHEST: ICD-10-CM

## 2025-06-14 DIAGNOSIS — R91.1 LUNG NODULE: Primary | ICD-10-CM

## 2025-06-14 NOTE — TELEPHONE ENCOUNTER
CT chest 6/14/25 shows     Spiculated 1.4 cm left upper lobe nodule with partial aeration.  Recommend further assessment with FDG PET-CT and/or tissue sampling.      6 mm right breast anterior subareolar nodularity, for which dedicated bilateral diagnostic mammogram and right ultrasound is recommended for complete evaluation.      6 mm and 7 mm right pulmonary nodules, for which follow-up CT chest is recommended in 3-6 months     Imp - lung nodule   Breast nodularity    Rec- PET scan    Referred to Pulm Dr Dickson Felix      Schedule bilateral diagnostic mammogram, right breast ultrasound    -Patient called; she verbalized understanding

## 2025-06-19 ENCOUNTER — TELEPHONE (OUTPATIENT)
Dept: INTERNAL MEDICINE CLINIC | Facility: CLINIC | Age: 70
End: 2025-06-19

## 2025-06-19 RX ORDER — EPINEPHRINE 0.3 MG/.3ML
0.3 INJECTION SUBCUTANEOUS AS NEEDED
Qty: 1 EACH | Refills: 1 | Status: SHIPPED | OUTPATIENT
Start: 2025-06-19 | End: 2026-06-19

## 2025-06-19 NOTE — TELEPHONE ENCOUNTER
Patient has a handicap parking placard from   Dr Ortiz that will  then end of     Patient still has difficulty walking and she would like to renew the placard    Advises she will complete her portion and would like to drop off the form for Dr VILLARREAL to complete    Patient also needs a new prescription for an   Epi pen  The Epi pen she has on hand from ER visit last year expires ;  she was advises to always have an Epi pen on hand    Patient requests call back from 's nurse   [phone]

## 2025-06-19 NOTE — TELEPHONE ENCOUNTER
Patient dropped off parking placard form   Placed on Drs desk  When completed pt would like to pick it up    Also asks if she should hold off on taking Lopresor before her PET scan on 6/26    Please call to advise on Epi pen Rx  -per message below - Medication question & parking placard form

## 2025-06-19 NOTE — TELEPHONE ENCOUNTER
Disability placard form signed; please call pt    ERx for EPI-pen sent    Advise take metoprolol before PET scan    Please call pt

## 2025-06-19 NOTE — TELEPHONE ENCOUNTER
Patient called and left detailed message relaying Dr Boogie's message. Parking placard placed at [facility]  for . Copy sent to scanning.

## 2025-06-19 NOTE — TELEPHONE ENCOUNTER
Patient returned our call.     Advised patient nurse left detailed message.     Informed patient parking placard is at  for .     Patient will listen to voicemail for clinical information.

## 2025-06-20 NOTE — TELEPHONE ENCOUNTER
Patient calling back checked her VMS.  No message left on vms.   Asking that we call her back with message.    Best call back number [phone]

## 2025-06-26 ENCOUNTER — HOSPITAL ENCOUNTER (OUTPATIENT)
Dept: NUCLEAR MEDICINE | Facility: HOSPITAL | Age: 70
Discharge: HOME | End: 2025-06-26
Attending: INTERNAL MEDICINE
Payer: MEDICARE

## 2025-06-26 DIAGNOSIS — R91.1 LUNG NODULE: ICD-10-CM

## 2025-06-26 LAB — GLUCOSE BLDC GLUCOMTR-MCNC: 115 MG/DL (ref 70–99)

## 2025-06-26 PROCEDURE — 82962 GLUCOSE BLOOD TEST: CPT

## 2025-06-26 PROCEDURE — 78815 PET IMAGE W/CT SKULL-THIGH: CPT | Performed by: INTERNAL MEDICINE

## 2025-06-27 ENCOUNTER — TELEPHONE (OUTPATIENT)
Dept: INTERNAL MEDICINE CLINIC | Facility: CLINIC | Age: 70
End: 2025-06-27

## 2025-06-27 NOTE — TELEPHONE ENCOUNTER
PET/CT on 6/26/25 shows   Mild hypermetabolic activity associated with the left upper lobe pulmonary nodule. Malignancy remains on the differential diagnosis. Biopsy may be considered.     Imp- lung nodule              Abnormal PET scan              Smoking     Rec-  recommend consult pulmonologist Dr Dickson Felix, or a pulmonary colleague     Appt scheduled 12/2/25; will forward to pulmonary pool to see if pt may be soon on a more expiditious schedule     Pt called; she verbalized understanding

## 2025-06-28 NOTE — PROGRESS NOTES
Asia,    PET/CT on 6/26/25 shows mild hypermetabolic activity associated with the left upper lobe pulmonary nodule. Malignancy remains on the differential diagnosis. Biopsy may be considered.      I recommend you see pulmonologist Dr Dickson Felix at [address], at phone [phone].  Please call me if you have any additional questions or concerns.    Dr Boogie

## 2025-06-30 ENCOUNTER — TELEPHONE (OUTPATIENT)
Dept: INTERNAL MEDICINE CLINIC | Facility: CLINIC | Age: 70
End: 2025-06-30

## 2025-06-30 NOTE — TELEPHONE ENCOUNTER
PET/CT on 6/26/25 shows mild hypermetabolic activity associated with the left upper lobe pulmonary nodule. Malignancy remains on the differential diagnosis. Biopsy may be considered.      Imp- lung nodule   Abnormal PET scan   Smoking    Rec-  recommend consult pulmonologist Dr Dickson Felix, or a pulmonary colleague    Appt scheduled 12/2/25; will forward to pulmonary pool to see if pt may be soon on a more expiditious schedule    Pt called

## 2025-06-30 NOTE — TELEPHONE ENCOUNTER
Spoke with patient. Appointment scheduled for 7/3/25 at 11:30AM. Confirmed date, time, place, and parking. Patient verbalized understanding.

## 2025-07-03 ENCOUNTER — OFFICE VISIT (OUTPATIENT)
Dept: PULMONOLOGY | Facility: CLINIC | Age: 70
End: 2025-07-03

## 2025-07-03 VITALS
RESPIRATION RATE: 16 BRPM | WEIGHT: 211 LBS | SYSTOLIC BLOOD PRESSURE: 119 MMHG | HEIGHT: 63 IN | BODY MASS INDEX: 37.39 KG/M2 | OXYGEN SATURATION: 95 % | HEART RATE: 86 BPM | DIASTOLIC BLOOD PRESSURE: 70 MMHG

## 2025-07-03 DIAGNOSIS — R91.1 LUNG NODULE: Primary | ICD-10-CM

## 2025-07-03 PROCEDURE — 99204 OFFICE O/P NEW MOD 45 MIN: CPT | Performed by: INTERNAL MEDICINE

## 2025-07-03 NOTE — H&P
Referring Physician  Isael Boogie MD    Chief Complaint  Lung nodule    History of Present Illness  Patient is a 69-year-old female presents pulm clinic for initial visit.  Incidental finding of left upper lobe lung nodule seen with recent PET/CT revealing hypermetabolic uptake.  Also in process of being evaluated by vascular surgeon for AAA and neurosurgery for management of intracranial aneurysm.  Denies significant worsening dyspnea symptoms.  Does not require maintenance inhaler therapy.    Review of Systems  Constitutional: denies weight loss, fevers, chills, weakness, fatigue, recent illness  HEENT: denies epistaxis, sore throat, postnasal drip  Cardio: denies chest pain, chest pressure, palpitations  Respiratory: denies dyspnea, cough, wheezing, hemoptysis   GI: denies nausea, vomiting, abdominal pain  : denies dysuria, hematuria  Musculoskeletal: denies arthralgia, myalgia  Integumentary: denies rash, itching  Neurological: denies syncope, weakness, dizziness,   Psychiatric: denies depression  Hematologic: denies bruising    Past Medical History  Past Medical History[1]     Surgical History  Past Surgical History[2]    Family History  Family History[3]     Social History  Tobacco: 22-pack-year history of tobacco abuse currently smoking half pack daily  Alcohol: Denies significant intake  Illicit Drugs: Denies    Medications  Medications Ordered Prior to Encounter[4]    Allergies  Allergies[5]    Physical Exam  Constitutional: no acute distress  HEENT: PERRL  Neck: supple, no JVD  Cardio: RRR, S1 S2  Respiratory: clear to auscultation bilaterally, no wheezing, rales, rhonchi, crackles  GI: abdomen soft, non tender, active bowel sounds, no organomegaly  Extremities: no clubbing, cyanosis, edema  Neurologic: no gross motor deficits  Skin: warm, dry  Lymphatic: no supraclavicular lymphadenopathy     Imaging  PET/CT performed on 6/26/2025 with mildly hypermetabolic left upper lobe nodule seen.    Pulmonary  Function Testing  None available to review    Assessment  1.  Left upper lobe lung nodule  2.  AAA  3.  Intracranial aneurysm  4.  Meningioma    Plan  -Patient presents today for pulmonary evaluation.  Recently discovered left upper lobe lung nodule on CT chest January 2022.  Subsequent PET/CT revealed hypermetabolic uptake seen in 1.4 cm left upper lobe nodule.  Given size and appearance of nodule cannot rule out underlying malignancy.  Differential diagnosis includes infectious inflammatory and malignancy.  Patient currently in process of being evaluated by vascular surgeon for possible AAA repair along with neurosurgery evaluation for intracranial aneurysm and meningioma.  Given her current situation prefers to prioritize intervention with vascular surgery first and obtain follow-up CT chest in 3-month interval which I believe is reasonable given size and appearance of her lung nodule.  Follow-up CT chest ordered for 3 months duration.  Will hold off on biopsy of left upper lobe nodule for now given her current situation.    Michael Felix DO  Pulmonary Critical Care Medicine  [facility]  7/3/2025  4:07 PM        [1]   Past Medical History:   AAA (abdominal aortic aneurysm)    CT Feb 2013; 2.5 cm AAA    Adrenal adenoma    CT Feb 2013 with 11 mm left adrenal adenoma    Age-related nuclear cataract of both eyes    Alternating esotropia    Anisometropia    Ankle fracture, right    Anxiety state    Back problem    Carpal tunnel syndrome on left    Cataract    Depression    Diverticulitis    Diverticulosis of large intestine    Macular pucker    right eye    Meibomian gland dysfunction (MGD) of both eyes    Osteoarthritis    Osteoarthritis of lumbar spine    PONV (postoperative nausea and vomiting)    Poor dentition    Rotator cuff tear, left    s/p repair ; Ortho Dr Velasquez    Smoking    Visual impairment   [2]   Past Surgical History:  Procedure Laterality Date    Arthroscopy of joint unlisted       Colonoscopy  8/11/16    Dr. Duran    Colonoscopy N/A 3/9/2017    Procedure: COLONOSCOPY;  Surgeon: Neptali Duran MD;  Location: [facility] ENDOSCOPY    Colonoscopy      Hip replacement surgery Right 01/2018    Knee arthroscopy      right knee 1970s    Other Left 1998    L rotator cuff repair    Other surgical history  8/17/14    8 dental implants    Total hip replacement Right     Wrist arthroscop,release xvers lig Left 01/28/2020    Left endoscopic carpal tunnel release   [3]   Family History  Problem Relation Age of Onset    Cancer Father         colon cancer    Heart Disorder Mother     Diabetes Mother     Cancer Brother         primary unknown    Breast Cancer Maternal Aunt     Ovarian Cancer Neg     DCIS Neg     LCIS Neg     BRCA gene + Neg     Glaucoma Neg     Macular degeneration Neg    [4]   Current Outpatient Medications on File Prior to Visit   Medication Sig Dispense Refill    EPINEPHrine (EPIPEN 2-ALBA) 0.3 MG/0.3ML Injection Solution Auto-injector Inject 0.3 mL (1 each total) as directed as needed. 1 each 1    metoprolol succinate ER 25 MG Oral Tablet 24 Hr Take 1 tablet (25 mg total) by mouth daily. 90 tablet 3    psyllium (METAMUCIL SMOOTH TEXTURE) 28 % Oral Powd Pack Take 1 packet by mouth daily.      Multiple Vitamin (ONE-DAILY MULTI VITAMINS) Oral Tab Take 1 tablet by mouth daily.      Varenicline Tartrate, Starter, (CHANTIX STARTING MONTH PAK) 0.5 MG X 11 & 1 MG X 42 Oral Tablet Therapy Pack Take 1 tablet by mouth daily. As instructed on pak (Patient not taking: Reported on 7/3/2025) 53 each 0    varenicline (CHANTIX CONTINUING MONTH PAK) 1 MG Oral Tab Take 1 tablet (1 mg total) by mouth 2 (two) times daily. (Patient not taking: Reported on 7/3/2025) 60 tablet 3    ibuprofen 600 MG Oral Tab Take 1 tablet (600 mg total) by mouth every 8 (eight) hours as needed for Pain. (Patient not taking: Reported on 7/3/2025) 90 tablet 0    lidocaine 5 % External Patch Place 1 patch onto the skin daily. (Patient not  taking: Reported on 7/3/2025) 14 patch 0    acetaminophen (TYLENOL) 325 MG Oral Tab Take 2 tablets (650 mg total) by mouth every 6 (six) hours as needed. (Patient not taking: Reported on 7/3/2025) 30 tablet 0     No current facility-administered medications on file prior to visit.   [5]   Allergies  Allergen Reactions    Bactrim [Sulfamethoxazole W/Trimethoprim] RASH

## 2025-07-09 ENCOUNTER — HOSPITAL ENCOUNTER (OUTPATIENT)
Dept: ULTRASOUND IMAGING | Facility: HOSPITAL | Age: 70
Discharge: HOME | End: 2025-07-09
Attending: INTERNAL MEDICINE
Payer: MEDICARE

## 2025-07-09 ENCOUNTER — HOSPITAL ENCOUNTER (OUTPATIENT)
Dept: MAMMOGRAPHY | Facility: HOSPITAL | Age: 70
Discharge: HOME | End: 2025-07-09
Attending: INTERNAL MEDICINE
Payer: MEDICARE

## 2025-07-09 DIAGNOSIS — N63.0 PERSISTENT NODULARITY OF BREAST: ICD-10-CM

## 2025-07-09 DIAGNOSIS — R92.8 ABNORMAL MAMMOGRAM: ICD-10-CM

## 2025-07-09 DIAGNOSIS — R93.89 ABNORMAL CT OF THE CHEST: ICD-10-CM

## 2025-07-09 PROCEDURE — 77062 BREAST TOMOSYNTHESIS BI: CPT | Performed by: INTERNAL MEDICINE

## 2025-07-09 PROCEDURE — 77066 DX MAMMO INCL CAD BI: CPT | Performed by: INTERNAL MEDICINE

## 2025-07-09 PROCEDURE — 76642 ULTRASOUND BREAST LIMITED: CPT | Performed by: INTERNAL MEDICINE

## (undated) DEVICE — SNARE CAPTIFLEX MICRO-OVL OLY

## (undated) DEVICE — ESMARK: Brand: DEROYAL

## (undated) DEVICE — SOL  .9 1000ML BTL

## (undated) DEVICE — PLASTIC HAND: Brand: MEDLINE INDUSTRIES, INC.

## (undated) DEVICE — SPECIMEN TRAP LUKI

## (undated) DEVICE — FORCEP RADIAL JAW 4

## (undated) DEVICE — GAMMEX® PI HYBRID SIZE 7, STERILE POWDER-FREE SURGICAL GLOVE, POLYISOPRENE AND NEOPRENE BLEND: Brand: GAMMEX

## (undated) DEVICE — ENDOSCOPY PACK - LOWER: Brand: MEDLINE INDUSTRIES, INC.

## (undated) DEVICE — ZIMMER® STERILE DISPOSABLE TOURNIQUET CUFF WITH PLC, DUAL PORT, SINGLE BLADDER, 18 IN. (46 CM)

## (undated) DEVICE — ECTRA II PROCEDURE KIT,                                    SINGLE-USE, DISPOSABLE. CONTENTS                                    PROBE KNIFE, RETROGRADE KNIFE,                                    TRIANGLE KNIFE, HAND PAD, SWABS: Brand: ECTRA

## (undated) DEVICE — Device: Brand: DEFENDO AIR/WATER/SUCTION AND BIOPSY VALVE

## (undated) DEVICE — SUTURE ETHILON 4-0 699G

## (undated) DEVICE — BANDAGE ROLL,100% COTTON, 6 PLY, SMALL: Brand: KERLIX

## (undated) NOTE — LETTER
[facility] ULTRASOUND  [facility]  [facility] [address]  [phone]    Authorization for Imaging Procedure  Date of Procedure:     I hereby authorize  __________________, my physician and his/her assistants (if applicable), which may include medical students, residents, and/or fellows, to perform the following procedure and administer such anesthesia as may be determined necessary by my physician: ULTRASOUND GUIDED FINE NEEDLE ASPIRATION LEFT THYROID NODULE FIRST LESION on Asia Jefferson.   2.  I recognize that during the procedure, unforeseen conditions may necessitate additional or different procedures than those listed above. I, therefore, further authorize and request that the above-named physician, assistants, or designees perform such procedures as are, in their judgment, necessary and desirable.    3.  My physician has discussed prior to my procedure the potential benefits, risks and side effects of this procedure; the likelihood of achieving goals; and potential problems that might occur during recuperation. They also discussed reasonable alternatives to the procedure, including risks, benefits, and side effects related to the alternatives and risks related to not receiving this procedure. I have had all my questions answered and I acknowledge that no guarantee has been made as to the result that may be obtained.    4.  Should the need arise during my procedure, which includes change of level of care prior to discharge, I also consent to the administration of blood and/or blood products. Further, I understand that despite careful testing and screening of blood or blood products by collecting agencies, I may still be subject to ill effects as a result of receiving a blood transfusion and/or blood products. The following are some, but not all, of the potential risks that can occur: fever and allergic reactions, hemolytic reactions, transmission of diseases such as Hepatitis, AIDS and  Cytomegalovirus (CMV) and fluid overload. In the event that I wish to have an autologous transfusion of my own blood, or a directed donor transfusion, I will discuss this with my physician.  Check only if Refusing Blood or Blood Products  I understand refusal of blood or blood products as deemed necessary by my physician may have serious consequences to my condition to include possible death. I hereby assume responsibility for my refusal and release the hospital, its personnel, and my physicians from any responsibility for the consequences of my refusal.   [  ] Patient Refuses Blood      5.  I authorize the use of any specimen, organs, tissues, body parts or foreign objects that may be removed from my body during the procedure for diagnosis, research or teaching purposes and their subsequent disposal by hospital authorities. I also authorize the release of specimen test results and/or written reports to my treating physician on the hospital medical staff or other referring or consulting physicians involved in my care, at the discretion of the Pathologist or my treating physician.    6.  I consent to the photographing or videotaping of the procedures to be performed, including appropriate portions of my body for medical, scientific, or educational purposes, provided my identity is not revealed by the pictures or by descriptive texts accompanying them. If the procedure has been photographed/videotaped, the physician will obtain the original picture, image, videotape or CD. The hospital will not be responsible for storage, release or maintenance of the picture, image, tape or CD.   7.  I consent to the presence of a  or observers in the operating room as deemed necessary by my physician or their designees.    8.  I recognize that in the event my procedure results in extended X-Ray/fluoroscopy time, I may develop a skin reaction.    9.  If I have a Do Not Attempt Resuscitation (DNAR) order in place,  that status will be suspended while in the operating room, procedural suite, and during the recovery period unless otherwise explicitly stated by me (or a person authorized to consent on my behalf). The performing physician or my attending physician will determine when the applicable recovery period ends for purposes of reinstating the DNAR order.  10.  I acknowledge that my physician has explained sedation/analgesia administration to me including the risk and benefits I consent to the administration of sedation/analgesia as may be necessary or desirable in the judgment of my physician.      I CERTIFY THAT I HAVE READ AND FULLY UNDERSTAND THE ABOVE CONSENT FOR THE PROCEDURE.   Signature of Patient: _____________________________________________________________  Responsible person in case of minor, unconscious: ____________________________________  Relationship to patient:  __________________________________________________________  Signature of Witness: _______________________________Date: _________Time: __________    Statement of Physician: My signature below affirms that prior to the time of the procedure, I have explained to the patient and/or her guardian, the risks and benefits involved in the proposed treatment and any reasonable alternative to the proposed treatment. I have also explained the risks and benefits involved in the refusal of the proposed treatment and have answered the patient's questions. If I have a significant financial interest in a co-management agreement or a significant financial interest in any product or implant, or other significant relationship used in the procedure/surgery, I have disclosed this and had a discussion with my patient.  Signature of Physician:   _________________________________Date:_____________Time:________  Patient Name: Asia Jefferson : 1955  Printed: 2025   Medical Record #: [id number]

## (undated) NOTE — LETTER
20      Patient: Javier Choudhury  : 1955 Visit date: 2020    Dear Jarod Elam,      I examined your patient in follow-up today. She is 3 weeks post left endoscopic carpal tunnel release.   Although she has residual numbnes

## (undated) NOTE — LETTER
20      Patient: Mackenzie Eric  : 1955 Visit date: 2020    Dear Candice Guevara,      I examined your patient in consultation today.     She has very severe bilateral carpal tunnel syndrome with constant numbness and burning, n

## (undated) NOTE — LETTER
20      Patient: Elenita Amin  : 1955 Visit date: 2020    Dear  ***,      I examined your patient in consultation today.     ***    Thank you for your kind referral. If I may answer any questions, please feel free t

## (undated) NOTE — LETTER
3/9/2017              Enrique Omar [address] [facility] [address]         Dear Edwardo Overton,    I wanted to get back to you with your colonoscopy results. You had 3 colon polyps removed which were benign.   I would advise a repeat co

## (undated) NOTE — MR AVS SNAPSHOT
LIS Barataria  [facility] [address] [address]  [phone]               Thank you for choosing us for your health care visit with Neena Burgos MD.  We are glad to serve you and happy to provide you with this summary of your visit.   Ple between 7:30am to 6pm and on Saturday between 8am and 1pm. Evening and weekend appointments for your exam are available. Walk-in patients are welcome for most exams.      [facility]/Isael and [address] [place] [address] Geraldine If you have questions, you can call [phone] to talk to our [facility] Staff. Remember, [employer] is NOT to be used for urgent needs. For medical emergencies, dial 911. Visit [email or web address] to learn more.            Visit [facility]

## (undated) NOTE — Clinical Note
1/11/2017    Dario Rosas Wojnowski        [address] [facility] [address]            Dear Coiln Aquino,      Our records indicate that you are due for an appointment for a Colonoscopy on or about February 2017 with Toby Rosas MD.    Please

## (undated) NOTE — MR AVS SNAPSHOT
LIS Ulysses  [facility] [address] [address]  [phone]               Thank you for choosing us for your health care visit with Jelani Ni MD.  We are glad to serve you and happy to provide you with this summary of your visit.   Ple Assoc Dx:  Pure hypercholesterolemia [E78.00]           Lipid Panel [E]    Complete by: Feb 03, 2017 (Approximate)    Assoc Dx:  Pure hypercholesterolemia [E78.00]           TSH [E]    Complete by:   Feb 03, 2017 (Approximate)    Assoc Dx:  Pure hyperch Visit [email or web address] org to learn more. Educational Information     Healthy Diet and Regular Exercise  [employer] for making healthy food choices  -   Enjoy your food, but eat less.   Fully enjoy your food when ea

## (undated) NOTE — LETTER
Alesha Dub [address]   Date:   1/23/2020     Name:   Reji Giang    YOB: 1955   MRN:   [id number]       WHERE IS YOUR PAIN NOW? Ran the areas on your body where you feel the described sensations.   Use the appropriat

## (undated) NOTE — LETTER
Alesha Dub [address]   Date:   1/13/2020     Name:   Columba Rosario    YOB: 1955   MRN:   [id number]       WHERE IS YOUR PAIN NOW? Arn the areas on your body where you feel the described sensations.   Use the appropriat

## (undated) NOTE — IP AVS SNAPSHOT
[facility] [address] ~ [phone]                Discharge Summary   3/9/2017    [facility]           Admission Information        Provider Department    3/9/2017 Rissa Rojas.  Harman Mcfarland MD [facility] E ALT Bilirubin,Total Total Protein Albumin Sodium Potassium Chloride    (02/03/17)  34 (02/03/17)  0.7 (02/03/17)  7.3 (02/03/17)  4.4 (02/03/17)  140 (02/03/17)  4.2 (02/03/17)  103      Radiology Exams     None         Additional Information       We are

## (undated) NOTE — ED AVS SNAPSHOT
Suman Chavez   MRN: [id number]    Department:  [facility]   Date of Visit:  7/19/2018           Disclosure     Insurance plans vary and the physician(s) referred by the ER may not be covered by your plan.  Please cont CARE PHYSICIAN AT ONCE OR RETURN IMMEDIATELY TO THE EMERGENCY DEPARTMENT. If you have been prescribed any medication(s), please fill your prescription right away and begin taking the medication(s) as directed.   If you believe that any of the medications